# Patient Record
Sex: MALE | Race: ASIAN | Employment: FULL TIME | URBAN - NONMETROPOLITAN AREA
[De-identification: names, ages, dates, MRNs, and addresses within clinical notes are randomized per-mention and may not be internally consistent; named-entity substitution may affect disease eponyms.]

---

## 2017-01-09 ENCOUNTER — OFFICE VISIT (OUTPATIENT)
Dept: FAMILY MEDICINE | Facility: OTHER | Age: 47
End: 2017-01-09
Attending: PHYSICIAN ASSISTANT
Payer: COMMERCIAL

## 2017-01-09 VITALS
WEIGHT: 180 LBS | OXYGEN SATURATION: 98 % | SYSTOLIC BLOOD PRESSURE: 123 MMHG | BODY MASS INDEX: 28.25 KG/M2 | TEMPERATURE: 96.9 F | DIASTOLIC BLOOD PRESSURE: 80 MMHG | HEART RATE: 61 BPM | HEIGHT: 67 IN

## 2017-01-09 DIAGNOSIS — E11.9 TYPE 2 DIABETES MELLITUS WITHOUT COMPLICATION, WITHOUT LONG-TERM CURRENT USE OF INSULIN (H): Primary | ICD-10-CM

## 2017-01-09 DIAGNOSIS — Z23 NEED FOR PROPHYLACTIC VACCINATION AND INOCULATION AGAINST INFLUENZA: ICD-10-CM

## 2017-01-09 DIAGNOSIS — Z71.89 ACP (ADVANCE CARE PLANNING): Chronic | ICD-10-CM

## 2017-01-09 DIAGNOSIS — R17 ELEVATED BILIRUBIN: ICD-10-CM

## 2017-01-09 PROCEDURE — 99214 OFFICE O/P EST MOD 30 MIN: CPT | Mod: 25 | Performed by: PHYSICIAN ASSISTANT

## 2017-01-09 PROCEDURE — 90686 IIV4 VACC NO PRSV 0.5 ML IM: CPT | Performed by: PHYSICIAN ASSISTANT

## 2017-01-09 PROCEDURE — 90471 IMMUNIZATION ADMIN: CPT | Performed by: PHYSICIAN ASSISTANT

## 2017-01-09 RX ORDER — GLIMEPIRIDE 2 MG/1
2 TABLET ORAL
Qty: 90 TABLET | Refills: 1 | Status: SHIPPED | OUTPATIENT
Start: 2017-01-09 | End: 2017-07-03

## 2017-01-09 RX ORDER — LISINOPRIL 5 MG/1
5 TABLET ORAL DAILY
Qty: 90 TABLET | Refills: 1 | Status: SHIPPED | OUTPATIENT
Start: 2017-01-09 | End: 2017-07-07

## 2017-01-09 RX ORDER — FEXOFENADINE HCL 180 MG/1
180 TABLET ORAL DAILY
Qty: 30 TABLET | Refills: 1 | COMMUNITY
Start: 2017-01-09

## 2017-01-09 ASSESSMENT — ANXIETY QUESTIONNAIRES
GAD7 TOTAL SCORE: 0
5. BEING SO RESTLESS THAT IT IS HARD TO SIT STILL: NOT AT ALL
2. NOT BEING ABLE TO STOP OR CONTROL WORRYING: NOT AT ALL
1. FEELING NERVOUS, ANXIOUS, OR ON EDGE: NOT AT ALL
6. BECOMING EASILY ANNOYED OR IRRITABLE: NOT AT ALL
7. FEELING AFRAID AS IF SOMETHING AWFUL MIGHT HAPPEN: NOT AT ALL
3. WORRYING TOO MUCH ABOUT DIFFERENT THINGS: NOT AT ALL

## 2017-01-09 ASSESSMENT — PATIENT HEALTH QUESTIONNAIRE - PHQ9: 5. POOR APPETITE OR OVEREATING: NOT AT ALL

## 2017-01-09 NOTE — PROGRESS NOTES
SUBJECTIVE:                                                    Pierre Sharma is a 46 year old male who presents to clinic today for the following health issues:      Diabetes Follow-up    Patient is checking blood sugars: once daily.  Results are as follows:         am - average 150    Diabetic concerns: None     Symptoms of hypoglycemia (low blood sugar): none     Paresthesias (numbness or burning in feet) or sores: No     Date of last diabetic eye exam: December 2015        Amount of exercise or physical activity: 4-5 days/week for an average of 30-45 minutes    Problems taking medications regularly: No    Medication side effects: none    Diet: no sugar diet        Problem list and histories reviewed & adjusted, as indicated.  Additional history: as documented    Patient Active Problem List   Diagnosis     ACP (advance care planning)     Past Surgical History   Procedure Laterality Date     Cholecystectomy  2012       Social History   Substance Use Topics     Smoking status: Current Every Day Smoker -- 0.05 packs/day     Types: Cigarettes     Smokeless tobacco: Not on file      Comment: pt is cuting back on his own      Alcohol Use: Yes      Comment: occassional      Family History   Problem Relation Age of Onset     Angina Mother      Hypertension Mother      DIABETES Mother          Current Outpatient Prescriptions   Medication Sig Dispense Refill     lisinopril (PRINIVIL/ZESTRIL) 5 MG tablet Take 1 tablet (5 mg) by mouth daily 90 tablet 1     SitaGLIPtin-MetFORMIN HCl (JANUMET XR)  MG TB24 Take 1 tablet by mouth daily 90 tablet 1     glimepiride (AMARYL) 2 MG tablet Take 1 tablet (2 mg) by mouth every morning (before breakfast) 90 tablet 1     UNABLE TO FIND MEDICATION NAME: Mopaday-Forte       UNABLE TO FIND MEDICATION NAME: Cardasc 5, hypertension       UNABLE TO FIND MEDICATION NAME: Imstamet 50/500, diabetic medication       UNABLE TO FIND MEDICATION NAME: Semiglynase, diabetic medication        "triamcinolone (KENALOG) 0.1 % ointment Apply sparingly to affected area three times daily for 14 days. 30 g 0     Allergies   Allergen Reactions     Sulfa Drugs      rash     BP Readings from Last 3 Encounters:   01/09/17 123/80   10/11/16 134/86    Wt Readings from Last 3 Encounters:   01/09/17 180 lb (81.647 kg)   10/11/16 182 lb (82.555 kg)                  Problem list, Medication list, Allergies, and Medical/Social/Surgical histories reviewed in [facility] and updated as appropriate.    ROS:  Constitutional, neuro, ENT, endocrine, pulmonary, cardiac, gastrointestinal, genitourinary, musculoskeletal, integument and psychiatric systems are negative, except as otherwise noted.    OBJECTIVE:                                                    /80 mmHg  Pulse 61  Temp(Src) 96.9  F (36.1  C) (Tympanic)  Ht 5' 6.5\" (1.689 m)  Wt 180 lb (81.647 kg)  BMI 28.62 kg/m2  SpO2 98%  Body mass index is 28.62 kg/(m^2).  GENERAL APPEARANCE: healthy, alert and no distress  EYES: Eyes grossly normal to inspection, PERRL and conjunctivae and sclerae normal  HENT: ear canals and TM's normal and nose and mouth without ulcers or lesions  NECK: no adenopathy, no asymmetry, masses, or scars and thyroid normal to palpation  RESP: lungs clear to auscultation - no rales, rhonchi or wheezes  CV: regular rates and rhythm, normal S1 S2, no S3 or S4 and no murmur, click or rub  LYMPHATICS: normal ant/post cervical and supraclavicular nodes  MS: extremities normal- no gross deformities noted  SKIN: no suspicious lesions or rashes. Has scattered dry darkened rashes on his back. Due to skin color is more prominent itching is relieved  with Allegra.     NEURO: Normal strength and tone, mentation intact and speech normal  PSYCH: mentation appears normal and affect normal/bright.      Diagnostic test results:  Diagnostic Test Results:  No results found for this or any previous visit (from the past 24 hour(s)).     ASSESSMENT/PLAN:               "                                      1. Type 2 diabetes mellitus without complication, without long-term current use of insulin (H)  His primary care has been in [place].  From what I am gathering they had him on ACTOS plus Metformin plus another combination with Metformin.  So due to this we will just try Januvia Met and also Amaryl.   He is checking his sugars are in range. Just had labs done in December in [place].  He did not bring those in.  He will have one month to get me those and if on our next visit in one month he doesn't have them we will repeat them for our records.   He is good on his weight and does occasional smoke and is working on stopping this.   Will check his sugar daily.   Needing foot exam and was normal today.   He is going to see eye Dr in spring as he is up to date.  Taking his asa 81 mg.     - Comprehensive metabolic panel; Future  - CBC with platelets differential; Future  - Hemoglobin A1c; Future  - Lipid Profile; Future  - Hepatic panel (Albumin, ALT, AST, Bili, Alk Phos, TP)  - lisinopril (PRINIVIL/ZESTRIL) 5 MG tablet; Take 1 tablet (5 mg) by mouth daily  Dispense: 90 tablet; Refill: 1  - SitaGLIPtin-MetFORMIN HCl (JANUMET XR)  MG TB24; Take 1 tablet by mouth daily  Dispense: 90 tablet; Refill: 1  - glimepiride (AMARYL) 2 MG tablet; Take 1 tablet (2 mg) by mouth every morning (before breakfast)  Dispense: 90 tablet; Refill: 1    2. ACP (advance care planning)  Given.     3. Need for prophylactic vaccination and inoculation against influenza  Given.   -  FLU VAC PRESRV FREE QUAD SPLIT VIR 3+YRS IM  - Vaccine Administration, Initial [87148]    4. Elevated bilirubin  Hx of this after his gallbladder was removed.  Was quite ill.   Thought things went back to normal but not sure.  Once we get labs we will make sure all are in line.     - Hepatic panel (Albumin, ALT, AST, Bili, Alk Phos, TP)  - lisinopril (PRINIVIL/ZESTRIL) 5 MG tablet; Take 1 tablet (5 mg) by mouth daily  Dispense: 90  tablet; Refill: 1      See Patient Instructions    CHANDLER Alamo  [facility] [place]        Injectable Influenza Immunization Documentation    1.  Is the person to be vaccinated sick today?  No    2. Does the person to be vaccinated have an allergy to eggs or to a component of the vaccine?  No    3. Has the person to be vaccinated today ever had a serious reaction to influenza vaccine in the past?  No    4. Has the person to be vaccinated ever had Guillain-[place] syndrome?  No     Form completed by Mini Oneil LPN

## 2017-01-09 NOTE — MR AVS SNAPSHOT
After Visit Summary   1/9/2017    Pierre Sharma    MRN: [id number]           Patient Information     Date Of Birth          1970        Visit Information        Provider Department      1/9/2017 11:45 AM Dayna Cronin PA [facility]        Today's Diagnoses     Type 2 diabetes mellitus without complication, without long-term current use of insulin (H)    -  1     ACP (advance care planning)         Need for prophylactic vaccination and inoculation against influenza         Elevated bilirubin           Care Instructions    Thank you for choosing [facility].   I appreciate the opportunity to serve you and look forward to supporting your healthcare needs in the future. Please contact me with any questions or concerns that you may have.    Sincerely,    Dayna Cronin RN, PA-C   cetaphil body wash.          Follow-ups after your visit        Your next 10 appointments already scheduled     Apr 18, 2017  9:45 AM   (Arrive by 9:30 AM)   New Visit with EVELIA Hernandez MD   [facility] ([facility])    [address] [place] [address] [id number] [phone]              Future tests that were ordered for you today     Open Future Orders        Priority Expected Expires Ordered    Comprehensive metabolic panel Routine  1/9/2018 1/9/2017    CBC with platelets differential Routine  1/9/2018 1/9/2017    Hemoglobin A1c Routine  1/9/2018 1/9/2017    Lipid Profile Routine  1/9/2018 1/9/2017            Who to contact     If you have questions or need follow up information about today's clinic visit or your schedule please contact [facility] directly at [phone].  Normal or non-critical lab and imaging results will be communicated to you by MyChart, letter or phone within 4 business days after the clinic has received the results. If you do not hear from us within 7 days, please contact the clinic through MyChart or phone. If you have a  "critical or abnormal lab result, we will notify you by phone as soon as possible.  Submit refill requests through [employer] or call your pharmacy and they will forward the refill request to us. Please allow 3 business days for your refill to be completed.          Additional Information About Your Visit        The [facility] Information     [employer] lets you send messages to your doctor, view your test results, renew your prescriptions, schedule appointments and more. To sign up, go to [email or web address] . Click on \"Log in\" on the left side of the screen, which will take you to the Welcome page. Then click on \"Sign up Now\" on the right side of the page.     You will be asked to enter the access code listed below, as well as some personal information. Please follow the directions to create your username and password.     Your access code is: [email or web address]  Expires: 2017  3:55 PM     Your access code will  in 90 days. If you need help or a new code, please call your [facility] clinic or [phone].        Care EveryWhere ID     This is your Care EveryWhere ID. This could be used by other organizations to access your [facility] medical records  [id number]        Your Vitals Were     Pulse Temperature Height BMI (Body Mass Index) Pulse Oximetry       61 96.9  F (36.1  C) (Tympanic) 5' 6.5\" (1.689 m) 28.62 kg/m2 98%        Blood Pressure from Last 3 Encounters:   17 123/80   10/11/16 134/86    Weight from Last 3 Encounters:   17 180 lb (81.647 kg)   10/11/16 182 lb (82.555 kg)              We Performed the Following     HC FLU VAC PRESRV FREE QUAD SPLIT VIR 3+YRS IM     Hepatic panel (Albumin, ALT, AST, Bili, Alk Phos, TP)     Vaccine Administration, Initial [[id number]]          Today's Medication Changes          These changes are accurate as of: 17 12:46 PM.  If you have any questions, ask your nurse or doctor.               Start taking these medicines.        Dose/Directions    glimepiride 2 MG tablet "   Commonly known as:  AMARYL   Used for:  Type 2 diabetes mellitus without complication, without long-term current use of insulin (H)   Started by:  Dayna Cronin PA        Dose:  2 mg   Take 1 tablet (2 mg) by mouth every morning (before breakfast)   Quantity:  90 tablet   Refills:  1       lisinopril 5 MG tablet   Commonly known as:  PRINIVIL/ZESTRIL   Used for:  Elevated bilirubin, Type 2 diabetes mellitus without complication, without long-term current use of insulin (H)   Started by:  Dayna Cronin PA        Dose:  5 mg   Take 1 tablet (5 mg) by mouth daily   Quantity:  90 tablet   Refills:  1       SitaGLIPtin-MetFORMIN HCl  MG Tb24   Commonly known as:  JANUMET XR   Used for:  Type 2 diabetes mellitus without complication, without long-term current use of insulin (H)   Started by:  Dayna Cronin PA        Dose:  1 tablet   Take 1 tablet by mouth daily   Quantity:  90 tablet   Refills:  1            Where to get your medicines      [id number] - [place], [place] - [address] AT [facility] [address], [facility] [address]     Phone:  [phone]    - glimepiride 2 MG tablet  - lisinopril 5 MG tablet  - SitaGLIPtin-MetFORMIN HCl  MG Tb24             Primary Care Provider    None Specified       No primary provider on file.        Thank you!     Thank you for choosing [facility]  for your care. Our goal is always to provide you with excellent care. Hearing back from our patients is one way we can continue to improve our services. Please take a few minutes to complete the written survey that you may receive in the mail after your visit with us. Thank you!             Your Updated Medication List - Protect others around you: Learn how to safely use, store and throw away your medicines at www.disposemymeds.org.          This list is accurate as of: 1/9/17 12:46 PM.  Always use your most recent med list.                    Brand Name Dispense Instructions for use    fexofenadine 180 MG tablet    ALLEGRA    30 tablet    Take 1 tablet (180 mg) by mouth daily       glimepiride 2 MG tablet    AMARYL    90 tablet    Take 1 tablet (2 mg) by mouth every morning (before breakfast)       lisinopril 5 MG tablet    PRINIVIL/ZESTRIL    90 tablet    Take 1 tablet (5 mg) by mouth daily       SitaGLIPtin-MetFORMIN HCl  MG Tb24    JANUMET XR    90 tablet    Take 1 tablet by mouth daily       triamcinolone 0.1 % ointment    KENALOG    30 g    Apply sparingly to affected area three times daily for 14 days.       * UNABLE TO FIND      MEDICATION NAME: Mopaday-Forte       * UNABLE TO FIND      MEDICATION NAME: Imstamet 50/500, diabetic medication       * UNABLE TO FIND      MEDICATION NAME: Semiglynase, diabetic medication       * UNABLE TO FIND      MEDICATION NAME: Cardasc 5, hypertension       * Notice:  This list has 4 medication(s) that are the same as other medications prescribed for you. Read the directions carefully, and ask your doctor or other care provider to review them with you.

## 2017-01-09 NOTE — NURSING NOTE
"Chief Complaint   Patient presents with     Diabetes     pt would like lab work done- he did eat breakfast this am      *_* Health Care Directive *_*     packet given to patient       Initial /80 mmHg  Pulse 61  Temp(Src) 96.9  F (36.1  C) (Tympanic)  Ht 5' 6.5\" (1.689 m)  Wt 180 lb (81.647 kg)  BMI 28.62 kg/m2  SpO2 98% Estimated body mass index is 28.62 kg/(m^2) as calculated from the following:    Height as of this encounter: 5' 6.5\" (1.689 m).    Weight as of this encounter: 180 lb (81.647 kg).  BP completed using cuff size: melissa Oneil LPN      "

## 2017-01-09 NOTE — PATIENT INSTRUCTIONS
Thank you for choosing [facility].   I appreciate the opportunity to serve you and look forward to supporting your healthcare needs in the future. Please contact me with any questions or concerns that you may have.    Sincerely,    Dayna Cronin RN, PA-C   cetaphil body wash.

## 2017-01-10 ASSESSMENT — ANXIETY QUESTIONNAIRES: GAD7 TOTAL SCORE: 0

## 2017-01-10 ASSESSMENT — PATIENT HEALTH QUESTIONNAIRE - PHQ9: SUM OF ALL RESPONSES TO PHQ QUESTIONS 1-9: 0

## 2017-01-12 ENCOUNTER — TELEPHONE (OUTPATIENT)
Dept: FAMILY MEDICINE | Facility: OTHER | Age: 47
End: 2017-01-12

## 2017-01-12 NOTE — TELEPHONE ENCOUNTER
Prior authorization done for medication Janumet.  This request was completed on Cover my meds. They will fax outcome.  Gayatri Riddle LPN

## 2017-01-18 ENCOUNTER — TELEPHONE (OUTPATIENT)
Dept: FAMILY MEDICINE | Facility: OTHER | Age: 47
End: 2017-01-18

## 2017-01-18 DIAGNOSIS — E11.8 TYPE 2 DIABETES MELLITUS WITH COMPLICATION, WITHOUT LONG-TERM CURRENT USE OF INSULIN (H): Primary | ICD-10-CM

## 2017-01-18 NOTE — TELEPHONE ENCOUNTER
Received faxes and phone calls from patient today about Janumet not being covered under insurance. You started on this as it was the equivilent to Istamet he was taking from [place]. I spoke to patient and the 3 diabetic meds he is taking is all he has ever tried, per insurance he has to try and fail Tradjenta and Jentadueto to get this covered. The (Jentadueto is a combination of Metformin and Linagliptin.) Please advise on what you want to change this to.    Also patient is wondering what [employer] you are going to change his Mopaday-Forte to? This is combination of Pioglitazone and Metformin. Brand name of Actoplus Met, Piomet or Politor.  Please advise.  Gayatri Riddle LPN

## 2017-01-18 NOTE — TELEPHONE ENCOUNTER
11:07 AM    Reason for Call: Phone Call    Description: Pt called to check on status of prior auth on [employer]/ins company stated they are still waiting on a verification from provider/please call pt back at [phone]/pt stated he has been out of med for 8 days    Was an appointment offered for this call? No    Preferred method for responding to this message: Telephone Call    If we cannot reach you directly, may we leave a detailed response at the number you provided? Yes    Can this message wait until your PCP/provider returns, if available today? Not applicable    Coco Du

## 2017-01-19 PROBLEM — E11.8 TYPE 2 DIABETES MELLITUS WITH COMPLICATION, WITHOUT LONG-TERM CURRENT USE OF INSULIN (H): Status: ACTIVE | Noted: 2017-01-19

## 2017-01-19 NOTE — TELEPHONE ENCOUNTER
Pt called back and stated he had not heard back from provider yet regarding med/please call him back at [phone]

## 2017-01-19 NOTE — TELEPHONE ENCOUNTER
We will try the Trujenta and Metformin.  Not covering Januvia.   He was on Avandia and Actos not been using those much.  They were all combined with Metformin.  For that reason we will give the metformen separate.

## 2017-01-19 NOTE — TELEPHONE ENCOUNTER
Left message that new medications were being sent in. Reminded he needs a follow up in 1 month and also needs to get us past medical records.  Gayatri Riddle LPN

## 2017-02-17 ENCOUNTER — ALLIED HEALTH/NURSE VISIT (OUTPATIENT)
Dept: FAMILY MEDICINE | Facility: OTHER | Age: 47
End: 2017-02-17
Attending: PHYSICIAN ASSISTANT
Payer: COMMERCIAL

## 2017-02-17 DIAGNOSIS — Z23 NEED FOR PROPHYLACTIC VACCINATION AND INOCULATION AGAINST INFLUENZA: Primary | ICD-10-CM

## 2017-02-17 PROCEDURE — 90686 IIV4 VACC NO PRSV 0.5 ML IM: CPT

## 2017-02-17 PROCEDURE — 90471 IMMUNIZATION ADMIN: CPT

## 2017-02-17 NOTE — PROGRESS NOTES
Patient presents for nurse only flu shot. Patient states he forgot to get this at his last visit. I reviewed chart as I remembered giving him injection. Per chart he had flu shot at last visit with Dayna Cronin PA-C. He insisting that he didn't receive this. Stated he was suppose to and was going to but got interrupted and never received it. I spoke with Dayna Cronin who also said she thought he had received it at his last visit. Patient still insistent he did not receive injection. Per Dayna Cronin okay to give flu shot. If he did receive like we think an additional injection will not harm him.  Gayatri Riddle LPN    Injectable Influenza Immunization Documentation    1.  Is the person to be vaccinated sick today?  No    2. Does the person to be vaccinated have an allergy to eggs or to a component of the vaccine?  No    3. Has the person to be vaccinated today ever had a serious reaction to influenza vaccine in the past?  No    4. Has the person to be vaccinated ever had Guillain-[place] syndrome?  No     Form completed by Gayatri Riddle LPN

## 2017-02-17 NOTE — MR AVS SNAPSHOT
"              After Visit Summary   2/17/2017    Pierre Sharma    MRN: [id number]           Patient Information     Date Of Birth          1970        Visit Information        Provider Department      2/17/2017 11:30 AM MICHELLE ANTHONY NURSE [facility]        Today's Diagnoses     Need for prophylactic vaccination and inoculation against influenza    -  1       Follow-ups after your visit        Your next 10 appointments already scheduled     Apr 18, 2017  9:45 AM CDT   (Arrive by 9:30 AM)   New Visit with EVELIA Hernandez MD   [facility] [place] ([facility] [place] [facility])    [address]  [place] [id number] [phone]              Who to contact     If you have questions or need follow up information about today's clinic visit or your schedule please contact [facility] directly at [phone].  Normal or non-critical lab and imaging results will be communicated to you by MyChart, letter or phone within 4 business days after the clinic has received the results. If you do not hear from us within 7 days, please contact the clinic through MyChart or phone. If you have a critical or abnormal lab result, we will notify you by phone as soon as possible.  Submit refill requests through [email or web address] or call your pharmacy and they will forward the refill request to us. Please allow 3 business days for your refill to be completed.          Additional Information About Your Visit        MyChart Information     [email or web address] lets you send messages to your doctor, view your test results, renew your prescriptions, schedule appointments and more. To sign up, go to [email or web address] . Click on \"Log in\" on the left side of the screen, which will take you to the Welcome page. Then click on \"Sign up Now\" on the right side of the page.     You will be asked to enter the access code listed below, as well as some personal information. Please follow the directions to create your username and " password.     Your access code is: [email or web address]  Expires: 2017  4:54 PM     Your access code will  in 90 days. If you need help or a new code, please call your [facility] clinic or [phone].        Care EveryWhere ID     This is your Care EveryWhere ID. This could be used by other organizations to access your [facility] medical records  [id number]         Blood Pressure from Last 3 Encounters:   17 123/80   10/11/16 134/86    Weight from Last 3 Encounters:   17 180 lb (81.6 kg)   10/11/16 182 lb (82.6 kg)              We Performed the Following     FLU VAC, SPLIT VIRUS IM > 3 YO (QUADRIVALENT) [00347]     Vaccine Administration, Initial [[id number]]        Primary Care Provider Office Phone # Fax #    CHANDLER Jennings [phone]       [facility] [address] [facility] [address]  [place] [address]        Thank you!     Thank you for choosing [facility]  for your care. Our goal is always to provide you with excellent care. Hearing back from our patients is one way we can continue to improve our services. Please take a few minutes to complete the written survey that you may receive in the mail after your visit with us. Thank you!             Your Updated Medication List - Protect others around you: Learn how to safely use, store and throw away your medicines at www.disposemymeds.org.          This list is accurate as of: 17  4:54 PM.  Always use your most recent med list.                   Brand Name Dispense Instructions for use    fexofenadine 180 MG tablet    ALLEGRA    30 tablet    Take 1 tablet (180 mg) by mouth daily       glimepiride 2 MG tablet    AMARYL    90 tablet    Take 1 tablet (2 mg) by mouth every morning (before breakfast)       linagliptin 5 MG Tabs tablet    TRADJENTA    30 tablet    Take 1 tablet (5 mg) by mouth daily       lisinopril 5 MG tablet    PRINIVIL/ZESTRIL    90 tablet    Take 1 tablet (5 mg) by mouth daily       metFORMIN 1000  MG tablet    GLUCOPHAGE    60 tablet    Take 1 tablet (1,000 mg) by mouth 2 times daily (with meals)       SitaGLIPtin-MetFORMIN HCl  MG Tb24    JANUMET XR    90 tablet    Take 1 tablet by mouth daily       triamcinolone 0.1 % ointment    KENALOG    30 g    Apply sparingly to affected area three times daily for 14 days.       * UNABLE TO FIND      MEDICATION NAME: Mopaday-Forte       * UNABLE TO FIND      MEDICATION NAME: Imstamet 50/500, diabetic medication       * UNABLE TO FIND      MEDICATION NAME: Semiglynase, diabetic medication       * UNABLE TO FIND      MEDICATION NAME: Cardasc 5, hypertension       * Notice:  This list has 4 medication(s) that are the same as other medications prescribed for you. Read the directions carefully, and ask your doctor or other care provider to review them with you.

## 2017-02-19 DIAGNOSIS — E11.8 TYPE 2 DIABETES MELLITUS WITH COMPLICATION, WITHOUT LONG-TERM CURRENT USE OF INSULIN (H): ICD-10-CM

## 2017-02-20 RX ORDER — LINAGLIPTIN 5 MG/1
TABLET, FILM COATED ORAL
Qty: 30 TABLET | Refills: 3 | Status: SHIPPED | OUTPATIENT
Start: 2017-02-20 | End: 2017-03-10

## 2017-02-21 DIAGNOSIS — E11.8 TYPE 2 DIABETES MELLITUS WITH COMPLICATION, WITHOUT LONG-TERM CURRENT USE OF INSULIN (H): ICD-10-CM

## 2017-03-10 ENCOUNTER — OFFICE VISIT (OUTPATIENT)
Dept: FAMILY MEDICINE | Facility: OTHER | Age: 47
End: 2017-03-10
Attending: PHYSICIAN ASSISTANT
Payer: COMMERCIAL

## 2017-03-10 VITALS
BODY MASS INDEX: 28.14 KG/M2 | TEMPERATURE: 97.3 F | WEIGHT: 177 LBS | DIASTOLIC BLOOD PRESSURE: 76 MMHG | HEART RATE: 63 BPM | SYSTOLIC BLOOD PRESSURE: 122 MMHG | OXYGEN SATURATION: 98 %

## 2017-03-10 DIAGNOSIS — E11.9 ENCOUNTER FOR DIABETIC FOOT EXAM (H): ICD-10-CM

## 2017-03-10 DIAGNOSIS — E11.8 TYPE 2 DIABETES MELLITUS WITH COMPLICATION, WITHOUT LONG-TERM CURRENT USE OF INSULIN (H): ICD-10-CM

## 2017-03-10 DIAGNOSIS — F17.200 TOBACCO USE DISORDER: Primary | ICD-10-CM

## 2017-03-10 PROCEDURE — 99214 OFFICE O/P EST MOD 30 MIN: CPT | Performed by: PHYSICIAN ASSISTANT

## 2017-03-10 ASSESSMENT — PAIN SCALES - GENERAL: PAINLEVEL: NO PAIN (0)

## 2017-03-10 NOTE — NURSING NOTE
"Chief Complaint   Patient presents with     Diabetes     follow up        Initial /76 (BP Location: Left arm, Patient Position: Chair, Cuff Size: Adult Large)  Pulse 63  Temp 97.3  F (36.3  C) (Tympanic)  Wt 177 lb (80.3 kg)  SpO2 98%  BMI 28.14 kg/m2 Estimated body mass index is 28.14 kg/(m^2) as calculated from the following:    Height as of 1/9/17: 5' 6.5\" (1.689 m).    Weight as of this encounter: 177 lb (80.3 kg).  Medication Reconciliation: complete  Salma Collier CMA(AAMA)   "

## 2017-03-10 NOTE — PATIENT INSTRUCTIONS
Diabetes: Importance of Exercise  Why is exercise important?  Exercise helps keep your child's blood sugar under control. You should make a special effort to plan daily exercise for your child. Young people with diabetes can participate in almost every sport. Many professional athletes have diabetes. Exercise helps people with diabetes (both type 1 and type 2) in many ways:  Exercise helps the body burn more sugar. Insulin is more effective during exercise. More sugar and insulin flows in the blood to the muscles during exercise. This causes more sugar to be burned. Exercise usually helps lower the blood sugar.   Exercise makes you feel better. Children who exercise tend not to tire as easily and feel happier and healthier.   Exercise helps keep the body in good shape. Lack of activity leads to health problems such as obesity and heart trouble. Exercise helps burn extra calories and helps your child keep a normal weight.   Exercise helps keep the heart rate and blood pressure lower. People who exercise have healthy hearts and the heart doesn't have to pump as hard. Low blood pressure helps prevent heart problems as well as other complications of diabetes such as eye and kidney problems.   Exercise helps keep blood fat levels normal. Many children with diabetes have high levels of the blood fats (cholesterol and triglycerides). High blood fat levels can lead to early aging of blood vessels. Exercise and good blood sugar control are the best ways to reduce blood fat levels.   Exercise helps the body become more sensitive to insulin. Research has shown that after 1 hour of afternoon exercise, blood sugars will stay lower until the next morning. Exercise makes the body more sensitive to insulin, the insulin can work more efficiently, and usually a lower daily dose is needed.   Exercise helps normal blood circulation to the feet. Exercise can help blood circulation to the feet and prevent foot  problems.  Exercise is very important for children who have type 2 diabetes or are at risk for type 2 diabetes. If your child is overweight, losing weight can reduce the risk for developing type 2 diabetes by more than half. This is best done by eating less and exercising more.  Which kinds of exercise are best?  The best exercise is one your child likes. It is easier to make exercise a habit if your child enjoys the activity. Your child needs to choose an aerobic exercise. Only aerobic exercises help the heart. Aerobic exercises include jogging, walking, swimming, or bicycling. They should be done for 30 minutes or longer. Activities such as weight lifting that are done in short bursts with rests in between are strength-building exercises, not aerobic exercise.  Boxing is not a good exercise for children with diabetes. Eye injuries are common in boxing and eye problems are a possible complication of diabetes. Also, the high risk for brain damage makes boxing dangerous for people with or without diabetes.  Preventing low blood sugar levels is important in all sports. It is very important for sports such as scuba diving that could be dangerous. Fortunately, dangerous activities are not generally used for daily aerobic activity.  How do I help my child get started?  When starting a new exercise program, it is always best to start slowly. Gradually extend the time and amount of exercise. This will result in fewer sore muscles and a better chance to continue the program. The best way to make exercise a part of everyday living is to start early in life. Older children may not be as willing to start a regular exercise program. Make exercise part of the normal routine. You need to be a good example by exercising regularly. Many children prefer TV or computer games to exercise. You may have to encourage a change in attitude. Make exercise activities such as skating and swimming a reward for your child. It helps if you can  have fun with your child in the activity. Jogging, walking, or jumping rope is good for parents too! A child of any age will soon  your good attitude toward exercise.   When should my child exercise?  The best time to exercise will vary with your schedule. Children like to play after school, and most organized sports activities take place then. This is the time that intermediate-acting insulin (such as NPH) has its main effect. Exercise often causes the body's blood sugar level to drop. Extra care to prevent low blood sugar is important. When possible, pick an exercise time, preferably the same time each day, and adjust the snacks and insulin dose to fit the exercise. Remember, you can adjust your child's diabetes management to suit your child's lifestyle. Your child's lifestyle does not have to be adjusted to fit diabetes.  When should my child not exercise?  If your child's urine ketone level is large or moderate, exercise can raise the sugar or ketone level even higher. So, do not let your child exercise when urine ketones are moderate or large. Remember to check urine ketones before exercising if your child is not feeling well.  How often should my child exercise?  To improve the health of the heart, your child should have at least 30 minutes of aerobic exercise 5 or more times per week. The more exercise a person gets, the more fat is burned. Some people burn more calories with their exercise than others. This is partly related to how hard the person exercises. If weight loss is one of the goals for your child, it may be necessary to exercise harder or for a longer period to reach the desired goals.  How can I help my child prevent low blood sugar (hypoglycemic) reactions during exercise?   You can plan the exercise after a meal, reduce the insulin dosage, or take extra snacks to help prevent low blood sugar during exercise. This will take some experimenting with dosages and record keeping. Your child  "should always carry a source of sugar at all times and have a longer lasting snack nearby. Remember, it is wise to THINK AHEAD about the day's schedule and plan accordingly.    Published by [employer].  This content is reviewed periodically and is subject to change as new health information becomes available. The information is intended to inform and educate and is not a replacement for medical evaluation, advice, diagnosis or treatment by a healthcare professional.  Abstracted from the book, \"Understanding Diabetes,\" 11th Edition, by MORIAH Coello MD (available by calling 1-901.706.1995).    2011 [employer] and/or its affiliates. All rights reserved.               "

## 2017-03-10 NOTE — MR AVS SNAPSHOT
After Visit Summary   3/10/2017    Pierre Sharma    MRN: [id number]           Patient Information     Date Of Birth          1970        Visit Information        Provider Department      3/10/2017 11:45 AM Dayna Cronin PA [facility]        Today's Diagnoses     Type 2 diabetes mellitus with complication, without long-term current use of insulin (H)          Care Instructions                     Diabetes: Importance of Exercise  Why is exercise important?  Exercise helps keep your child's blood sugar under control. You should make a special effort to plan daily exercise for your child. Young people with diabetes can participate in almost every sport. Many professional athletes have diabetes. Exercise helps people with diabetes (both type 1 and type 2) in many ways:  Exercise helps the body burn more sugar. Insulin is more effective during exercise. More sugar and insulin flows in the blood to the muscles during exercise. This causes more sugar to be burned. Exercise usually helps lower the blood sugar.   Exercise makes you feel better. Children who exercise tend not to tire as easily and feel happier and healthier.   Exercise helps keep the body in good shape. Lack of activity leads to health problems such as obesity and heart trouble. Exercise helps burn extra calories and helps your child keep a normal weight.   Exercise helps keep the heart rate and blood pressure lower. People who exercise have healthy hearts and the heart doesn't have to pump as hard. Low blood pressure helps prevent heart problems as well as other complications of diabetes such as eye and kidney problems.   Exercise helps keep blood fat levels normal. Many children with diabetes have high levels of the blood fats (cholesterol and triglycerides). High blood fat levels can lead to early aging of blood vessels. Exercise and good blood sugar control are the best ways to reduce blood fat levels.   Exercise  helps the body become more sensitive to insulin. Research has shown that after 1 hour of afternoon exercise, blood sugars will stay lower until the next morning. Exercise makes the body more sensitive to insulin, the insulin can work more efficiently, and usually a lower daily dose is needed.   Exercise helps normal blood circulation to the feet. Exercise can help blood circulation to the feet and prevent foot problems.  Exercise is very important for children who have type 2 diabetes or are at risk for type 2 diabetes. If your child is overweight, losing weight can reduce the risk for developing type 2 diabetes by more than half. This is best done by eating less and exercising more.  Which kinds of exercise are best?  The best exercise is one your child likes. It is easier to make exercise a habit if your child enjoys the activity. Your child needs to choose an aerobic exercise. Only aerobic exercises help the heart. Aerobic exercises include jogging, walking, swimming, or bicycling. They should be done for 30 minutes or longer. Activities such as weight lifting that are done in short bursts with rests in between are strength-building exercises, not aerobic exercise.  Boxing is not a good exercise for children with diabetes. Eye injuries are common in boxing and eye problems are a possible complication of diabetes. Also, the high risk for brain damage makes boxing dangerous for people with or without diabetes.  Preventing low blood sugar levels is important in all sports. It is very important for sports such as scuba diving that could be dangerous. Fortunately, dangerous activities are not generally used for daily aerobic activity.  How do I help my child get started?  When starting a new exercise program, it is always best to start slowly. Gradually extend the time and amount of exercise. This will result in fewer sore muscles and a better chance to continue the program. The best way to make exercise a part of  everyday living is to start early in life. Older children may not be as willing to start a regular exercise program. Make exercise part of the normal routine. You need to be a good example by exercising regularly. Many children prefer TV or computer games to exercise. You may have to encourage a change in attitude. Make exercise activities such as skating and swimming a reward for your child. It helps if you can have fun with your child in the activity. Jogging, walking, or jumping rope is good for parents too! A child of any age will soon  your good attitude toward exercise.   When should my child exercise?  The best time to exercise will vary with your schedule. Children like to play after school, and most organized sports activities take place then. This is the time that intermediate-acting insulin (such as NPH) has its main effect. Exercise often causes the body's blood sugar level to drop. Extra care to prevent low blood sugar is important. When possible, pick an exercise time, preferably the same time each day, and adjust the snacks and insulin dose to fit the exercise. Remember, you can adjust your child's diabetes management to suit your child's lifestyle. Your child's lifestyle does not have to be adjusted to fit diabetes.  When should my child not exercise?  If your child's urine ketone level is large or moderate, exercise can raise the sugar or ketone level even higher. So, do not let your child exercise when urine ketones are moderate or large. Remember to check urine ketones before exercising if your child is not feeling well.  How often should my child exercise?  To improve the health of the heart, your child should have at least 30 minutes of aerobic exercise 5 or more times per week. The more exercise a person gets, the more fat is burned. Some people burn more calories with their exercise than others. This is partly related to how hard the person exercises. If weight loss is one of the goals  "for your child, it may be necessary to exercise harder or for a longer period to reach the desired goals.  How can I help my child prevent low blood sugar (hypoglycemic) reactions during exercise?   You can plan the exercise after a meal, reduce the insulin dosage, or take extra snacks to help prevent low blood sugar during exercise. This will take some experimenting with dosages and record keeping. Your child should always carry a source of sugar at all times and have a longer lasting snack nearby. Remember, it is wise to THINK AHEAD about the day's schedule and plan accordingly.    Published by [employer].  This content is reviewed periodically and is subject to change as new health information becomes available. The information is intended to inform and educate and is not a replacement for medical evaluation, advice, diagnosis or treatment by a healthcare professional.  Abstracted from the book, \"Understanding Diabetes,\" 11th Edition, by MORIAH Coello MD (available by calling 1-358.218.6290).    2011 [facility] and/or its affiliates. All rights reserved.                     Follow-ups after your visit        Your next 10 appointments already scheduled     Apr 18, 2017  9:45 AM CDT   (Arrive by 9:30 AM)   New Visit with EVELIA Hernandez MD   [facility] [place] ([facility])    [address]  [place] [address] [phone]              Future tests that were ordered for you today     Open Future Orders        Priority Expected Expires Ordered    Albumin Random Urine Quantitative Routine  3/10/2018 3/10/2017    Hemoglobin A1c Routine  3/10/2018 3/10/2017    CBC with platelets differential Routine  3/10/2018 3/10/2017    Comprehensive metabolic panel Routine  3/10/2018 3/10/2017    Lipid Profile Routine  3/10/2018 3/10/2017            Who to contact     If you have questions or need follow up information about today's clinic visit or your schedule please contact [facility] " "directly at [phone].  Normal or non-critical lab and imaging results will be communicated to you by MyChart, letter or phone within 4 business days after the clinic has received the results. If you do not hear from us within 7 days, please contact the clinic through [facility] or phone. If you have a critical or abnormal lab result, we will notify you by phone as soon as possible.  Submit refill requests through [employer] or call your pharmacy and they will forward the refill request to us. Please allow 3 business days for your refill to be completed.          Additional Information About Your Visit        [facility] Information     [employer] lets you send messages to your doctor, view your test results, renew your prescriptions, schedule appointments and more. To sign up, go to [email or web address] . Click on \"Log in\" on the left side of the screen, which will take you to the Welcome page. Then click on \"Sign up Now\" on the right side of the page.     You will be asked to enter the access code listed below, as well as some personal information. Please follow the directions to create your username and password.     Your access code is: [email or web address]  Expires: 2017  4:54 PM     Your access code will  in 90 days. If you need help or a new code, please call your [facility] clinic or [phone].        Care EveryWhere ID     This is your Care EveryWhere ID. This could be used by other organizations to access your [facility] medical records  [id number]        Your Vitals Were     Pulse Temperature Pulse Oximetry BMI (Body Mass Index)          63 97.3  F (36.3  C) (Tympanic) 98% 28.14 kg/m2         Blood Pressure from Last 3 Encounters:   03/10/17 122/76   17 123/80   10/11/16 134/86    Weight from Last 3 Encounters:   03/10/17 177 lb (80.3 kg)   17 180 lb (81.6 kg)   10/11/16 182 lb (82.6 kg)                 Today's Medication Changes          These changes are accurate as of: 3/10/17 12:56 PM.  If " you have any questions, ask your nurse or doctor.               These medicines have changed or have updated prescriptions.        Dose/Directions    linagliptin 5 MG Tabs tablet   Commonly known as:  TRADJENTA   This may have changed:  See the new instructions.   Used for:  Type 2 diabetes mellitus with complication, without long-term current use of insulin (H)   Changed by:  Dayna Cronin PA        Dose:  5 mg   Take 1 tablet (5 mg) by mouth daily   Quantity:  90 tablet   Refills:  3            Where to get your medicines      These medications were sent to [id number] - [place] - [address] AT [facility] [address], [place] [address]     Phone:  [phone]     linagliptin 5 MG Tabs tablet                Primary Care Provider Office Phone # Fax #    CHANDLER Jennings [phone]       [facility] [address]  [place] [address]        Thank you!     Thank you for choosing [facility]  for your care. Our goal is always to provide you with excellent care. Hearing back from our patients is one way we can continue to improve our services. Please take a few minutes to complete the written survey that you may receive in the mail after your visit with us. Thank you!             Your Updated Medication List - Protect others around you: Learn how to safely use, store and throw away your medicines at www.disposemymeds.org.          This list is accurate as of: 3/10/17 12:56 PM.  Always use your most recent med list.                   Brand Name Dispense Instructions for use    fexofenadine 180 MG tablet    ALLEGRA    30 tablet    Take 1 tablet (180 mg) by mouth daily       glimepiride 2 MG tablet    AMARYL    90 tablet    Take 1 tablet (2 mg) by mouth every morning (before breakfast)       linagliptin 5 MG Tabs tablet    TRADJENTA    90 tablet    Take 1 tablet (5 mg) by mouth daily       lisinopril 5 MG tablet     PRINIVIL/ZESTRIL    90 tablet    Take 1 tablet (5 mg) by mouth daily       metFORMIN 1000 MG tablet    GLUCOPHAGE    60 tablet    TAKE 1 TABLET BY MOUTH TWICE DAILY WITH MEALS       SitaGLIPtin-MetFORMIN HCl  MG Tb24    JANUMET XR    90 tablet    Take 1 tablet by mouth daily       triamcinolone 0.1 % ointment    KENALOG    30 g    Apply sparingly to affected area three times daily for 14 days.       * UNABLE TO FIND      MEDICATION NAME: Mopaday-Forte       * UNABLE TO FIND      MEDICATION NAME: Imstamet 50/500, diabetic medication       * UNABLE TO FIND      MEDICATION NAME: Semiglynase, diabetic medication       * UNABLE TO FIND      MEDICATION NAME: Cardasc 5, hypertension       * Notice:  This list has 4 medication(s) that are the same as other medications prescribed for you. Read the directions carefully, and ask your doctor or other care provider to review them with you.

## 2017-03-10 NOTE — PROGRESS NOTES
SUBJECTIVE:                                                    Pierre Sharma is a 46 year old male who presents to clinic today for the following health issues:      Diabetes Follow-up      Patient is checking blood sugars: every 2 to 3 days     Diabetic concerns: None     Symptoms of hypoglycemia (low blood sugar): none     Paresthesias (numbness or burning in feet) or sores: No     Date of last diabetic eye exam: DUE        Amount of exercise or physical activity: None    Problems taking medications regularly: No    Medication side effects: none  Diet: regular (no restrictions) no sweets     Hyperlipidemia Follow-Up      Rate your low fat/cholesterol diet?: good    Taking statin?  No    Other lipid medications/supplements?:  none       Problem list and histories reviewed & adjusted, as indicated.  Additional history: as documented    Patient Active Problem List   Diagnosis     ACP (advance care planning)     Type 2 diabetes mellitus with complication, without long-term current use of insulin (H)     Past Surgical History   Procedure Laterality Date     Cholecystectomy  2012       Social History   Substance Use Topics     Smoking status: Current Every Day Smoker     Packs/day: 0.05     Types: Cigarettes     Smokeless tobacco: Not on file      Comment: pt is cuting back on his own      Alcohol use Yes      Comment: occassional      Family History   Problem Relation Age of Onset     Angina Mother      Hypertension Mother      DIABETES Mother          Current Outpatient Prescriptions   Medication Sig Dispense Refill     metFORMIN (GLUCOPHAGE) 1000 MG tablet TAKE 1 TABLET BY MOUTH TWICE DAILY WITH MEALS 60 tablet 2     TRADJENTA 5 MG TABS tablet TAKE 1 TABLET BY MOUTH EVERY DAY 30 tablet 3     lisinopril (PRINIVIL/ZESTRIL) 5 MG tablet Take 1 tablet (5 mg) by mouth daily 90 tablet 1     SitaGLIPtin-MetFORMIN HCl (JANUMET XR)  MG TB24 Take 1 tablet by mouth daily 90 tablet 1     glimepiride (AMARYL) 2 MG  tablet Take 1 tablet (2 mg) by mouth every morning (before breakfast) 90 tablet 1     fexofenadine (ALLEGRA) 180 MG tablet Take 1 tablet (180 mg) by mouth daily 30 tablet 1     UNABLE TO FIND MEDICATION NAME: Mopaday-Forte       UNABLE TO FIND MEDICATION NAME: Imstamet 50/500, diabetic medication       UNABLE TO FIND MEDICATION NAME: Semiglynase, diabetic medication       UNABLE TO FIND MEDICATION NAME: Cardasc 5, hypertension       triamcinolone (KENALOG) 0.1 % ointment Apply sparingly to affected area three times daily for 14 days. 30 g 0     Allergies   Allergen Reactions     Sulfa Drugs      rash     BP Readings from Last 3 Encounters:   03/10/17 122/76   01/09/17 123/80   10/11/16 134/86    Wt Readings from Last 3 Encounters:   03/10/17 177 lb (80.3 kg)   01/09/17 180 lb (81.6 kg)   10/11/16 182 lb (82.6 kg)                    Reviewed and updated as needed this visit by clinical staff  Tobacco  Allergies  Meds  Med Hx  Surg Hx  Fam Hx  Soc Hx      Reviewed and updated as needed this visit by Provider         ROS:  Constitutional, neuro, ENT, endocrine, pulmonary, cardiac, gastrointestinal, genitourinary, musculoskeletal, integument and psychiatric systems are negative, except as otherwise noted.    OBJECTIVE:                                                    /76 (BP Location: Left arm, Patient Position: Chair, Cuff Size: Adult Large)  Pulse 63  Temp 97.3  F (36.3  C) (Tympanic)  Wt 177 lb (80.3 kg)  SpO2 98%  BMI 28.14 kg/m2  Body mass index is 28.14 kg/(m^2).  GENERAL APPEARANCE: healthy, alert and no distress  EYES: Eyes grossly normal to inspection, PERRL and conjunctivae and sclerae normal  HENT: ear canals and TM's normal and nose and mouth without ulcers or lesions  NECK: no adenopathy, no asymmetry, masses, or scars and thyroid normal to palpation  RESP: lungs clear to auscultation - no rales, rhonchi or wheezes  CV: regular rates and rhythm, normal S1 S2, no S3 or S4 and no murmur, click  or rub  LYMPHATICS: normal ant/post cervical and supraclavicular nodes  ABDOMEN: soft, nontender, without hepatosplenomegaly or masses and bowel sounds normal  MS: extremities normal- no gross deformities noted  SKIN: no suspicious lesions or rashes  NEURO: Normal strength and tone, mentation intact and speech normal  PSYCH: mentation appears normal and affect normal/bright    Diagnostic test results:  Diagnostic Test Results:  No results found for this or any previous visit.     Lab Results   Component Value Date    WBC 6.0 03/13/2017     Lab Results   Component Value Date    RBC 4.66 03/13/2017     Lab Results   Component Value Date    HGB 14.5 03/13/2017     Lab Results   Component Value Date    HCT 41.7 03/13/2017     No components found for: MCT  Lab Results   Component Value Date    MCV 90 03/13/2017     Lab Results   Component Value Date    MCH 31.1 03/13/2017     Lab Results   Component Value Date    MCHC 34.8 03/13/2017     Lab Results   Component Value Date    RDW 12.8 03/13/2017     Lab Results   Component Value Date     03/13/2017     Lab Results   Component Value Date    A1C 6.6 03/13/2017     Lab Results   Component Value Date    ALBUMIN 3.8 03/13/2017     Lab Results   Component Value Date    AST 16 03/13/2017     Lab Results   Component Value Date    ALT 31 03/13/2017     No results found for: BILICONJ   Lab Results   Component Value Date    BILITOTAL 0.5 03/13/2017     Lab Results   Component Value Date    ALBUMIN 3.8 03/13/2017     Lab Results   Component Value Date    PROTTOTAL 7.3 03/13/2017      Lab Results   Component Value Date    ALKPHOS 101 03/13/2017     No components found for: URINE    ASSESSMENT/PLAN:                                                    1. Type 2 diabetes mellitus with complication, without long-term current use of insulin (H)  He was to bring labs in from  [facility].  We made a large change in his medication for diabetes.   We are not finding his labs are not up to  date.   We will do those today.  Reviewed his medication required if diabetic.  Once we get this all in line we will make further scipts.  He is getting his medication here. Getting his eye exam as it is due in the spring.   We did discuss cholesterol medications and his mom does have a hx of heard failure and disease but his father is quite healthy.   He does smoke and we did address this today. We did check his liver as he had some kind of hepatitis years ago. He ws told it should cause much in the way of problems.  See us back in 6 months.      - Albumin Random Urine Quantitative; Future  - Hemoglobin A1c; Future  - CBC with platelets differential; Future  - Comprehensive metabolic panel; Future  - Lipid Profile; Future  - linagliptin (TRADJENTA) 5 MG TABS tablet; Take 1 tablet (5 mg) by mouth daily  Dispense: 90 tablet; Refill: 3      Follow up with Provider - 6 month     CHANDLER Alamo  [facility]

## 2017-03-13 DIAGNOSIS — E11.8 TYPE 2 DIABETES MELLITUS WITH COMPLICATION, WITHOUT LONG-TERM CURRENT USE OF INSULIN (H): ICD-10-CM

## 2017-03-13 DIAGNOSIS — R17 ELEVATED BILIRUBIN: ICD-10-CM

## 2017-03-13 DIAGNOSIS — E11.9 TYPE 2 DIABETES MELLITUS WITHOUT COMPLICATION, WITHOUT LONG-TERM CURRENT USE OF INSULIN (H): ICD-10-CM

## 2017-03-13 LAB
ALBUMIN SERPL-MCNC: 3.8 G/DL (ref 3.4–5)
ALP SERPL-CCNC: 101 U/L (ref 40–150)
ALT SERPL W P-5'-P-CCNC: 31 U/L (ref 0–70)
ANION GAP SERPL CALCULATED.3IONS-SCNC: 9 MMOL/L (ref 3–14)
AST SERPL W P-5'-P-CCNC: 16 U/L (ref 0–45)
BASOPHILS # BLD AUTO: 0 10E9/L (ref 0–0.2)
BASOPHILS NFR BLD AUTO: 0.3 %
BILIRUB DIRECT SERPL-MCNC: 0.1 MG/DL (ref 0–0.2)
BILIRUB SERPL-MCNC: 0.5 MG/DL (ref 0.2–1.3)
BUN SERPL-MCNC: 9 MG/DL (ref 7–30)
CALCIUM SERPL-MCNC: 8 MG/DL (ref 8.5–10.1)
CHLORIDE SERPL-SCNC: 106 MMOL/L (ref 94–109)
CHOLEST SERPL-MCNC: 152 MG/DL
CO2 SERPL-SCNC: 24 MMOL/L (ref 20–32)
CREAT SERPL-MCNC: 1.06 MG/DL (ref 0.66–1.25)
CREAT UR-MCNC: 86 MG/DL
DIFFERENTIAL METHOD BLD: NORMAL
EOSINOPHIL # BLD AUTO: 0.1 10E9/L (ref 0–0.7)
EOSINOPHIL NFR BLD AUTO: 1.2 %
ERYTHROCYTE [DISTWIDTH] IN BLOOD BY AUTOMATED COUNT: 12.8 % (ref 10–15)
EST. AVERAGE GLUCOSE BLD GHB EST-MCNC: 143 MG/DL
GFR SERPL CREATININE-BSD FRML MDRD: 75 ML/MIN/1.7M2
GLUCOSE SERPL-MCNC: 101 MG/DL (ref 70–99)
HBA1C MFR BLD: 6.6 % (ref 4.3–6)
HCT VFR BLD AUTO: 41.7 % (ref 40–53)
HDLC SERPL-MCNC: 36 MG/DL
HGB BLD-MCNC: 14.5 G/DL (ref 13.3–17.7)
IMM GRANULOCYTES # BLD: 0 10E9/L (ref 0–0.4)
IMM GRANULOCYTES NFR BLD: 0.2 %
LDLC SERPL CALC-MCNC: 98 MG/DL
LYMPHOCYTES # BLD AUTO: 1.9 10E9/L (ref 0.8–5.3)
LYMPHOCYTES NFR BLD AUTO: 32 %
MCH RBC QN AUTO: 31.1 PG (ref 26.5–33)
MCHC RBC AUTO-ENTMCNC: 34.8 G/DL (ref 31.5–36.5)
MCV RBC AUTO: 90 FL (ref 78–100)
MICROALBUMIN UR-MCNC: <5 MG/L
MICROALBUMIN/CREAT UR: NORMAL MG/G CR (ref 0–17)
MONOCYTES # BLD AUTO: 0.4 10E9/L (ref 0–1.3)
MONOCYTES NFR BLD AUTO: 6.3 %
NEUTROPHILS # BLD AUTO: 3.6 10E9/L (ref 1.6–8.3)
NEUTROPHILS NFR BLD AUTO: 60 %
NONHDLC SERPL-MCNC: 116 MG/DL
NRBC # BLD AUTO: 0 10*3/UL
NRBC BLD AUTO-RTO: 0 /100
PLATELET # BLD AUTO: 216 10E9/L (ref 150–450)
POTASSIUM SERPL-SCNC: 3.6 MMOL/L (ref 3.4–5.3)
PROT SERPL-MCNC: 7.3 G/DL (ref 6.8–8.8)
RBC # BLD AUTO: 4.66 10E12/L (ref 4.4–5.9)
SODIUM SERPL-SCNC: 139 MMOL/L (ref 133–144)
TRIGL SERPL-MCNC: 92 MG/DL
WBC # BLD AUTO: 6 10E9/L (ref 4–11)

## 2017-03-13 PROCEDURE — 80061 LIPID PANEL: CPT | Performed by: PHYSICIAN ASSISTANT

## 2017-03-13 PROCEDURE — 82043 UR ALBUMIN QUANTITATIVE: CPT | Performed by: PHYSICIAN ASSISTANT

## 2017-03-13 PROCEDURE — 83036 HEMOGLOBIN GLYCOSYLATED A1C: CPT | Performed by: PHYSICIAN ASSISTANT

## 2017-03-13 PROCEDURE — 36415 COLL VENOUS BLD VENIPUNCTURE: CPT | Performed by: PHYSICIAN ASSISTANT

## 2017-03-13 PROCEDURE — 82248 BILIRUBIN DIRECT: CPT | Performed by: PHYSICIAN ASSISTANT

## 2017-03-13 PROCEDURE — 85025 COMPLETE CBC W/AUTO DIFF WBC: CPT | Performed by: PHYSICIAN ASSISTANT

## 2017-03-13 PROCEDURE — 80053 COMPREHEN METABOLIC PANEL: CPT | Performed by: PHYSICIAN ASSISTANT

## 2017-03-30 ENCOUNTER — HOSPITAL ENCOUNTER (EMERGENCY)
Facility: HOSPITAL | Age: 47
Discharge: HOME | End: 2017-03-30
Attending: NURSE PRACTITIONER | Admitting: NURSE PRACTITIONER
Payer: COMMERCIAL

## 2017-03-30 VITALS
TEMPERATURE: 99.4 F | RESPIRATION RATE: 116 BRPM | SYSTOLIC BLOOD PRESSURE: 139 MMHG | OXYGEN SATURATION: 95 % | DIASTOLIC BLOOD PRESSURE: 84 MMHG

## 2017-03-30 DIAGNOSIS — J06.9 VIRAL URI WITH COUGH: ICD-10-CM

## 2017-03-30 LAB
FLUAV+FLUBV AG SPEC QL: NEGATIVE
FLUAV+FLUBV AG SPEC QL: NORMAL
SPECIMEN SOURCE: NORMAL

## 2017-03-30 PROCEDURE — 99213 OFFICE O/P EST LOW 20 MIN: CPT

## 2017-03-30 PROCEDURE — 87804 INFLUENZA ASSAY W/OPTIC: CPT | Mod: 59 | Performed by: FAMILY MEDICINE

## 2017-03-30 PROCEDURE — 99213 OFFICE O/P EST LOW 20 MIN: CPT | Performed by: NURSE PRACTITIONER

## 2017-03-30 ASSESSMENT — ENCOUNTER SYMPTOMS
FEVER: 1
COUGH: 1
RHINORRHEA: 1
HEADACHES: 1
NAUSEA: 0
APPETITE CHANGE: 0
MYALGIAS: 1
CARDIOVASCULAR NEGATIVE: 1
SORE THROAT: 1
EYES NEGATIVE: 1
VOMITING: 0
ACTIVITY CHANGE: 1
DIARRHEA: 0

## 2017-03-30 NOTE — ED AVS SNAPSHOT
[facility]    [address]    [place] [address]    Phone:  [phone]                                       Pierre Sharma   MRN: [id number]    Department:  [facility]   Date of Visit:  3/30/2017           Patient Information     Date Of Birth          1970        Your diagnoses for this visit were:     Viral URI with cough        You were seen by Izzy Conway NP.      Follow-up Information     Follow up with Dayna Cronin PA.    Specialty:  Family Practice    Why:  As needed, If symptoms worsen    Contact information:    [facility]  [address]  [place] [address] [phone]          Follow up with [facility].    Specialty:  EMERGENCY MEDICINE    Why:  As needed, If symptoms worsen    Contact information:    [address]  [place] [address] [phone]    Additional information:    From [place]: Take [address]. Turn left at [address]. Turn left at the first stoplight on [address]. At the first stop sign, take a right onto [address]. Take a left into the parking lot and continue through until you reach the North enterance of the building.       From [place]: Take [address]. Take the [address] ramp towards [place]. Turn left onto [address]. Take a slight right onto [address]. Turn left at the first stoplight on [address]. At the first stop sign, take a right onto [address]. Take a left into the parking lot and continue through until you reach the North enterance of the building.       From [place]: Take [address]. Take a right at [address]. At the first stop sign, take a right onto [address]. Take a left into the parking lot and continue through until you reach the North enterance of the building.         Discharge Instructions         Viral Upper Respiratory Illness (Adult)  You have a viral upper  respiratory illness (URI), which is another term for the common cold. This illness is contagious during the first few days. It is spread through the air by coughing and sneezing. It may also be spread by direct contact (touching the sick person and then touching your own eyes, nose, or mouth). Frequent handwashing will decrease risk of spread. Most viral illnesses go away within 7 to 10 days with rest and simple home remedies. Sometimes the illness may last for several weeks. Antibiotics will not kill a virus, and they are generally not prescribed for this condition.    Home care    If symptoms are severe, rest at home for the first 2 to 3 days. When you resume activity, don't let yourself get too tired.    Avoid being exposed to cigarette smoke (yours or others ).    You may use acetaminophen or ibuprofen to control pain and fever, unless another medicine was prescribed. (Note: If you have chronic liver or kidney disease, have ever had a stomach ulcer or gastrointestinal bleeding, or are taking blood-thinning medicines, talk with your healthcare provider before using these medicines.) Aspirin should never be given to anyone under 18 years of age who is ill with a viral infection or fever. It may cause severe liver or brain damage.    Your appetite may be poor, so a light diet is fine. Avoid dehydration by drinking 6 to 8 glasses of fluids per day (water, soft drinks, juices, tea, or soup). Extra fluids will help loosen secretions in the nose and lungs.    Over-the-counter cold medicines will not shorten the length of time you re sick, but they may be helpful for the following symptoms: cough, sore throat, and nasal and sinus congestion. (Note: Do not use decongestants if you have high blood pressure.)  Follow-up care  Follow up with your healthcare provider, or as advised.  When to seek medical advice  Call your healthcare provider right away if any of these occur:    Cough with lots of colored sputum  (mucus)    Severe headache; face, neck, or ear pain    Difficulty swallowing due to throat pain    Fever of 100.4 F (38 C)  Call 911, or get immediate medical care  Call emergency services right away if any of these occur:    Chest pain, shortness of breath, wheezing, or difficulty breathing    Coughing up blood    Inability to swallow due to throat pain    1337-5854 [employer]. [address] [place] [address]. All rights reserved. This information is not intended as a substitute for professional medical care. Always follow your healthcare professional's instructions.      Continue the mucinex for expectorating    Future Appointments        Provider Department Dept Phone Center    4/18/2017 9:45 AM H Manjit Hernandez MD [facility] [phone] [place]         Review of your medicines      Our records show that you are taking the medicines listed below. If these are incorrect, please call your family doctor or clinic.        Dose / Directions Last dose taken    fexofenadine 180 MG tablet   Commonly known as:  ALLEGRA   Dose:  180 mg   Quantity:  30 tablet        Take 1 tablet (180 mg) by mouth daily   Refills:  1        glimepiride 2 MG tablet   Commonly known as:  AMARYL   Dose:  2 mg   Quantity:  90 tablet        Take 1 tablet (2 mg) by mouth every morning (before breakfast)   Refills:  1        IBUPROFEN PO   Dose:  800 mg        Take 800 mg by mouth every 8 hours as needed for moderate pain   Refills:  0        linagliptin 5 MG Tabs tablet   Commonly known as:  TRADJENTA   Dose:  5 mg   Quantity:  90 tablet        Take 1 tablet (5 mg) by mouth daily   Refills:  3        lisinopril 5 MG tablet   Commonly known as:  PRINIVIL/ZESTRIL   Dose:  5 mg   Quantity:  90 tablet        Take 1 tablet (5 mg) by mouth daily   Refills:  1        metFORMIN 1000 MG tablet   Commonly known as:  GLUCOPHAGE   Quantity:  60 tablet        TAKE 1 TABLET BY MOUTH TWICE DAILY WITH MEALS  "  Refills:  2        SitaGLIPtin-MetFORMIN HCl  MG Tb24   Commonly known as:  JANUMET XR   Dose:  1 tablet   Quantity:  90 tablet        Take 1 tablet by mouth daily   Refills:  1                Procedures and tests performed during your visit     Influenza A/B antigen      Orders Needing Specimen Collection     None      Pending Results     No orders found from 3/28/2017 to 3/31/2017.            Pending Culture Results     No orders found from 3/28/2017 to 3/31/2017.            Thank you for choosing [facility]       Thank you for choosing [facility] for your care. Our goal is always to provide you with excellent care. Hearing back from our patients is one way we can continue to improve our services. Please take a few minutes to complete the written survey that you may receive in the mail after you visit with us. Thank you!        [employer] Information     [employer] lets you send messages to your doctor, view your test results, renew your prescriptions, schedule appointments and more. To sign up, go to [email or web address] . Click on \"Log in\" on the left side of the screen, which will take you to the Welcome page. Then click on \"Sign up Now\" on the right side of the page.     You will be asked to enter the access code listed below, as well as some personal information. Please follow the directions to create your username and password.     Your access code is: [email or web address]  Expires: 2017  5:54 PM     Your access code will  in 90 days. If you need help or a new code, please call your [facility] clinic or [phone].        Care EveryWhere ID     This is your Care EveryWhere ID. This could be used by other organizations to access your [facility] medical records  [id number]        After Visit Summary       This is your record. Keep this with you and show to your community pharmacist(s) and doctor(s) at your next visit.                  "

## 2017-03-30 NOTE — ED AVS SNAPSHOT
[facility]    [address] [address]    Phone:  [phone]                                       Pierre Sharma   MRN: [id number]    Department:  [facility]   Date of Visit:  3/30/2017           After Visit Summary Signature Page     I have received my discharge instructions, and my questions have been answered. I have discussed any challenges I see with this plan with the nurse or doctor.    ..........................................................................................................................................  Patient/Patient Representative Signature      ..........................................................................................................................................  Patient Representative Print Name and Relationship to Patient    ..................................................               ................................................  Date                                            Time    ..........................................................................................................................................  Reviewed by Signature/Title    ...................................................              ..............................................  Date                                                            Time

## 2017-03-30 NOTE — LETTER
[facility]  [address] [address]  Phone: [phone]    March 30, 2017        Pierre MYERS  [facility] [address]          To whom it may concern:    RE: Pierre Sharma    Patient was seen and treated today at our clinic and missed work.  He was out of work 3/29-30/2017    Please contact me for questions or concerns.      Sincerely,        Izzy Conway CNP

## 2017-03-31 NOTE — ED PROVIDER NOTES
History     Chief Complaint   Patient presents with     URI     cough and body aches     The history is provided by the patient. No  was used.     Pierre Sharma is a 46 year old male who presents with 3 days of URI sx. With cough.  Has had temp max 101.4 . Today. Had influenza vaccine this year. Taking fluids well.           I have reviewed the Medications, Allergies, Past Medical and Surgical History, and Social History in the Epic system.    Review of Systems   Constitutional: Positive for activity change and fever. Negative for appetite change.   HENT: Positive for congestion, rhinorrhea and sore throat.    Eyes: Negative.    Respiratory: Positive for cough (productive to yellow phlegm).    Cardiovascular: Negative.    Gastrointestinal: Negative for diarrhea, nausea and vomiting.   Genitourinary: Negative.    Musculoskeletal: Positive for myalgias.   Skin: Negative for rash.   Neurological: Positive for headaches.   Hematological: Negative for adenopathy.       Physical Exam   BP: 139/84  Heart Rate: 90  Temp: 99.4  F (37.4  C)  Resp: 16  SpO2: 95 % recheck of 98%  Physical Exam   Constitutional: He is oriented to person, place, and time. He appears well-developed and well-nourished. No distress.   HENT:   Head: Normocephalic and atraumatic.   Right Ear: External ear normal.   Left Ear: External ear normal.   Mouth/Throat: Oropharynx is clear and moist.   Nasal mucosa erythematous, mucopurulent drainage present    Eyes: Conjunctivae and EOM are normal. Pupils are equal, round, and reactive to light. Right eye exhibits no discharge. Left eye exhibits no discharge. No scleral icterus.   Neck: Normal range of motion. Neck supple.   Cardiovascular: Normal rate, regular rhythm and normal heart sounds.  Exam reveals no gallop and no friction rub.    No murmur heard.  Pulmonary/Chest: Effort normal and breath sounds normal. No respiratory distress. He has no wheezes. He has no rales.    Abdominal: Soft. Bowel sounds are normal. There is no tenderness. There is no rebound and no guarding.   Musculoskeletal: Normal range of motion.   Lymphadenopathy:     He has no cervical adenopathy.   Neurological: He is alert and oriented to person, place, and time.   Skin: Skin is warm and dry. No rash noted. He is not diaphoretic.   Nursing note and vitals reviewed.      ED Course     ED Course     Procedures               Labs Ordered and Resulted from Time of ED Arrival Up to the Time of Departure from the ED   INFLUENZA A/B ANTIGEN     Negative results discussed  Assessments & Plan (with Medical Decision Making)     I have reviewed the nursing notes.    I have reviewed the findings, diagnosis, plan and need for follow up with the patient.    Pathophysiology, possible etiology and treatment with potential outcomes, risks, benefits, and alternatives discussed to the best of my ability      Ibuprofen for fever and discomfort 800 mg TID stop any stomach upset.  Mucinex as desired    Discharge Medication List as of 3/30/2017  8:17 PM          Final diagnoses:   Viral URI with cough       3/30/2017   [facility]     Izzy Conway NP  04/03/17 [id number]

## 2017-03-31 NOTE — ED NOTES
Pt reports symptoms of chest congestion and coughing, throat pain with coughing started Monday, fever started Tuesday. gen'l body and headache. Low back and joint discomfort. mucinex this am and ibuprofen po 800mg last at 1630.

## 2017-03-31 NOTE — DISCHARGE INSTRUCTIONS

## 2017-04-03 ASSESSMENT — ENCOUNTER SYMPTOMS: ADENOPATHY: 0

## 2017-06-27 ENCOUNTER — TELEPHONE (OUTPATIENT)
Dept: EDUCATION SERVICES | Facility: HOSPITAL | Age: 47
End: 2017-06-27

## 2017-06-27 NOTE — TELEPHONE ENCOUNTER
12:26 PM    Reason for Call: Phone Call    Description: Patient's spouse, Pierre, calling in regards to patient's insulin stating the one she is currently on is not working and she has gained weight and she would like to go back on Lantus if possible. Patient uses [facility].     Was an appointment offered for this call? Yes Mehrdad 07/07    Preferred method for responding to this message: Telephone Call    If we cannot reach you directly, may we leave a detailed response at the number you provided? Yes    Can this message wait until your PCP/provider returns, if available today? Tamiko,     Lucia Almaraz

## 2017-07-03 DIAGNOSIS — E11.9 TYPE 2 DIABETES MELLITUS WITHOUT COMPLICATION, WITHOUT LONG-TERM CURRENT USE OF INSULIN (H): ICD-10-CM

## 2017-07-05 DIAGNOSIS — E11.8 TYPE 2 DIABETES MELLITUS WITH COMPLICATION, WITHOUT LONG-TERM CURRENT USE OF INSULIN (H): ICD-10-CM

## 2017-07-05 RX ORDER — GLIMEPIRIDE 2 MG/1
TABLET ORAL
Qty: 90 TABLET | Refills: 0 | Status: SHIPPED | OUTPATIENT
Start: 2017-07-05 | End: 2017-09-28

## 2017-07-05 NOTE — TELEPHONE ENCOUNTER
Amaryl         Last Written Prescription Date: 1/9/17  Last Fill Quantity: 90, # refills: 0  Last Office Visit with [facility], [facility] or  Health prescribing provider:  3/10/17   Next 5 appointments (look out 90 days)     Jul 07, 2017  1:15 PM CDT   (Arrive by 1:00 PM)   SHORT with CHANDLER Jennings   [facility] [place] ([facility] [place] )    [address]  [place] [address]   [phone]                   BP Readings from Last 3 Encounters:   03/30/17 139/84   03/10/17 122/76   01/09/17 123/80     Lab Results   Component Value Date    MICROL <5 03/13/2017     Lab Results   Component Value Date    UMALCR Unable to calculate due to low value 03/13/2017     Creatinine   Date Value Ref Range Status   03/13/2017 1.06 0.66 - 1.25 mg/dL Final   ]  GFR Estimate   Date Value Ref Range Status   03/13/2017 75 >60 mL/min/1.7m2 Final     Comment:     Non  GFR Calc     GFR Estimate If Black   Date Value Ref Range Status   03/13/2017 >90   GFR Calc   >60 mL/min/1.7m2 Final     Lab Results   Component Value Date    CHOL 152 03/13/2017     Lab Results   Component Value Date    HDL 36 03/13/2017     Lab Results   Component Value Date    LDL 98 03/13/2017     Lab Results   Component Value Date    TRIG 92 03/13/2017     No results found for: CHOLHDLRATIO  Lab Results   Component Value Date    AST 16 03/13/2017     Lab Results   Component Value Date    ALT 31 03/13/2017     Lab Results   Component Value Date    A1C 6.6 03/13/2017     Potassium   Date Value Ref Range Status   03/13/2017 3.6 3.4 - 5.3 mmol/L Final

## 2017-07-07 ENCOUNTER — OFFICE VISIT (OUTPATIENT)
Dept: FAMILY MEDICINE | Facility: OTHER | Age: 47
End: 2017-07-07
Attending: PHYSICIAN ASSISTANT
Payer: COMMERCIAL

## 2017-07-07 VITALS
HEIGHT: 67 IN | OXYGEN SATURATION: 98 % | SYSTOLIC BLOOD PRESSURE: 122 MMHG | WEIGHT: 175 LBS | TEMPERATURE: 97 F | DIASTOLIC BLOOD PRESSURE: 78 MMHG | BODY MASS INDEX: 27.47 KG/M2 | HEART RATE: 65 BPM

## 2017-07-07 DIAGNOSIS — E11.8 TYPE 2 DIABETES MELLITUS WITH COMPLICATION, WITHOUT LONG-TERM CURRENT USE OF INSULIN (H): ICD-10-CM

## 2017-07-07 PROCEDURE — 99214 OFFICE O/P EST MOD 30 MIN: CPT | Performed by: PHYSICIAN ASSISTANT

## 2017-07-07 RX ORDER — LISINOPRIL 5 MG/1
5 TABLET ORAL DAILY
Qty: 90 TABLET | Refills: 1 | Status: SHIPPED | OUTPATIENT
Start: 2017-07-07 | End: 2018-02-13

## 2017-07-07 ASSESSMENT — PAIN SCALES - GENERAL: PAINLEVEL: NO PAIN (0)

## 2017-07-07 NOTE — NURSING NOTE
"Chief Complaint   Patient presents with     Diabetes     Follow UP       Initial /78 (BP Location: Left arm, Patient Position: Chair, Cuff Size: Adult Regular)  Pulse 65  Temp 97  F (36.1  C) (Tympanic)  Ht 5' 6.5\" (1.689 m)  Wt 175 lb (79.4 kg)  SpO2 98%  BMI 27.82 kg/m2 Estimated body mass index is 27.82 kg/(m^2) as calculated from the following:    Height as of this encounter: 5' 6.5\" (1.689 m).    Weight as of this encounter: 175 lb (79.4 kg).  Medication Reconciliation: complete   Yani Carlin LPN    "

## 2017-07-07 NOTE — MR AVS SNAPSHOT
After Visit Summary   7/7/2017    Pierre Sharma    MRN: [id number]           Patient Information     Date Of Birth          1970        Visit Information        Provider Department      7/7/2017 1:15 PM Dayna Cronin PA [facility]        Today's Diagnoses     Type 2 diabetes mellitus with complication, without long-term current use of insulin (H)        Type 2 diabetes mellitus without complication, without long-term current use of insulin (H)          Care Instructions      Thank you for choosing [facility].   I have office hours 8:00 am to 4:30 pm on Monday's, Wednesday's, Thursday's and Friday's. My nurse and I are out of the office every Tuesday.    Following your visit, when your labs and diagnostic testing have returned, I will review then and you will be contacted by my nurse.  If you are on My Chart, you can also view results there.    For refills, notify your pharmacy regarding what you need and the pharmacy will generate a refill request. Do not call my nurse as she is unable to process refill request. Please plan ahead and allow 3-5 days for refill requests.    You will generally receive a reminder call the day prior to your appointment.  If you cannot attend your appointment, please cancel your appointment with as much notice as possible.  If there is a pattern of failure to present for your appointments, I cannot provide consistent, meaningful, ongoing care for you. It is very important to me that you come in for your care, so we can best assist you with your health care needs.    IMPORTANT:  Please note that it is my standard of practice to NOT participate in prescribing ongoing requested Narcotic Analgesic therapy, and/or participate in the prescribing of other controlled substances.  My nurse and I am happy to assist you with the process of referral for alternative pain management as needed, and other treatment modalities including but not limited  "to:  Physical Therapy, Physical Medicine and Rehab, Counseling, Chiropractic Care, Orthopedic Care, and non-narcotic medication management.     In the event that you need to be seen for emergent concerns and I am out of office,  please see one of my colleagues for acute concerns.  You may also present to  or ER.  I appreciate the opportunity to serve you and look forward to supporting your healthcare needs in the future. Please contact me with any questions or concerns that you may have.    Sincerely,      Dayna Cronin RN, PA-C               Follow-ups after your visit        Who to contact     If you have questions or need follow up information about today's clinic visit or your schedule please contact [facility] directly at [phone].  Normal or non-critical lab and imaging results will be communicated to you by [employer], letter or phone within 4 business days after the clinic has received the results. If you do not hear from us within 7 days, please contact the clinic through [employer] or phone. If you have a critical or abnormal lab result, we will notify you by phone as soon as possible.  Submit refill requests through [employer] or call your pharmacy and they will forward the refill request to us. Please allow 3 business days for your refill to be completed.          Additional Information About Your Visit        [employer] Information     [employer] lets you send messages to your doctor, view your test results, renew your prescriptions, schedule appointments and more. To sign up, go to [email or web address] . Click on \"Log in\" on the left side of the screen, which will take you to the Welcome page. Then click on \"Sign up Now\" on the right side of the page.     You will be asked to enter the access code listed below, as well as some personal information. Please follow the directions to create your username and password.     Your access code is: [email or web address]  Expires: 10/5/2017  1:51 PM     Your " "access code will  in 90 days. If you need help or a new code, please call your [facility] clinic or [phone].        Care EveryWhere ID     This is your Care EveryWhere ID. This could be used by other organizations to access your [facility] medical records  [id number]        Your Vitals Were     Pulse Temperature Height Pulse Oximetry BMI (Body Mass Index)       65 97  F (36.1  C) (Tympanic) 5' 6.5\" (1.689 m) 98% 27.82 kg/m2        Blood Pressure from Last 3 Encounters:   17 122/78   17 139/84   03/10/17 122/76    Weight from Last 3 Encounters:   17 175 lb (79.4 kg)   03/10/17 177 lb (80.3 kg)   17 180 lb (81.6 kg)              Today, you had the following     No orders found for display         Where to get your medicines      These medications were sent to [facility] [id number] [place] [address], [place] - [address] AT [facility] [address]  [address], [facility] [address]     Phone:  [phone]     lisinopril 5 MG tablet    metFORMIN 1000 MG tablet          Primary Care Provider Office Phone # Fax #    CHANDLER Jennings [phone]       [facility] [address]  [facility] [address]        Equal Access to Services     [facility] AH: Hadii aad ku hadasho Soomaali, waaxda luqadaha, qaybta kaalmada adeegyada, waxay idiin hayaan jenny jones . So [facility] [phone].    ATENCIÓN: Si habla español, tiene a song disposición servicios gratuitos de asistencia lingüística. Llame al [phone].    We comply with applicable federal civil rights laws and [place] laws. We do not discriminate on the basis of race, color, national origin, age, disability sex, sexual orientation or gender identity.            Thank you!     Thank you for choosing [facility]  for your care. Our goal is always to provide you with excellent care. Hearing back from our patients is one way we can continue to improve our services. Please take a few minutes " to complete the written survey that you may receive in the mail after your visit with us. Thank you!             Your Updated Medication List - Protect others around you: Learn how to safely use, store and throw away your medicines at www.disposemymeds.org.          This list is accurate as of: 7/7/17  1:57 PM.  Always use your most recent med list.                   Brand Name Dispense Instructions for use Diagnosis    fexofenadine 180 MG tablet    ALLEGRA    30 tablet    Take 1 tablet (180 mg) by mouth daily        glimepiride 2 MG tablet    AMARYL    90 tablet    TAKE 1 TABLET BY MOUTH EVERY MORNING BEFORE BREAKFAST    Type 2 diabetes mellitus without complication, without long-term current use of insulin (H)       IBUPROFEN PO      Take 800 mg by mouth every 8 hours as needed for moderate pain        linagliptin 5 MG Tabs tablet    TRADJENTA    90 tablet    Take 1 tablet (5 mg) by mouth daily    Type 2 diabetes mellitus with complication, without long-term current use of insulin (H)       lisinopril 5 MG tablet    PRINIVIL/ZESTRIL    90 tablet    Take 1 tablet (5 mg) by mouth daily    Type 2 diabetes mellitus without complication, without long-term current use of insulin (H)       metFORMIN 1000 MG tablet    GLUCOPHAGE    60 tablet    TAKE 1 TABLET BY MOUTH TWICE DAILY WITH MEALS    Type 2 diabetes mellitus with complication, without long-term current use of insulin (H)

## 2017-07-07 NOTE — PATIENT INSTRUCTIONS
Thank you for choosing [facility].   I have office hours 8:00 am to 4:30 pm on Monday's, Wednesday's, Thursday's and Friday's. My nurse and I are out of the office every Tuesday.    Following your visit, when your labs and diagnostic testing have returned, I will review then and you will be contacted by my nurse.  If you are on My Chart, you can also view results there.    For refills, notify your pharmacy regarding what you need and the pharmacy will generate a refill request. Do not call my nurse as she is unable to process refill request. Please plan ahead and allow 3-5 days for refill requests.    You will generally receive a reminder call the day prior to your appointment.  If you cannot attend your appointment, please cancel your appointment with as much notice as possible.  If there is a pattern of failure to present for your appointments, I cannot provide consistent, meaningful, ongoing care for you. It is very important to me that you come in for your care, so we can best assist you with your health care needs.    IMPORTANT:  Please note that it is my standard of practice to NOT participate in prescribing ongoing requested Narcotic Analgesic therapy, and/or participate in the prescribing of other controlled substances.  My nurse and I am happy to assist you with the process of referral for alternative pain management as needed, and other treatment modalities including but not limited to:  Physical Therapy, Physical Medicine and Rehab, Counseling, Chiropractic Care, Orthopedic Care, and non-narcotic medication management.     In the event that you need to be seen for emergent concerns and I am out of office,  please see one of my colleagues for acute concerns.  You may also present to  or ER.  I appreciate the opportunity to serve you and look forward to supporting your healthcare needs in the future. Please contact me with any questions or concerns that you may  have.    Sincerely,      Dayna Cronin RN, PA-C

## 2017-07-07 NOTE — PROGRESS NOTES
SUBJECTIVE:                                                    Pierre Sharma is a 47 year old male who presents to clinic today for the following health issues:      Diabetes Follow-up      Patient is checking blood sugars: 2-3 times a week    Diabetic concerns: None     Symptoms of hypoglycemia (low blood sugar): shaky, weak     Paresthesias (numbness or burning in feet) or sores: No     Date of last diabetic eye exam: January 2016      Amount of exercise or physical activity: 2-3 days/week for an average of 30-45 minutes    Problems taking medications regularly: No    Medication side effects: none    Diet: regular (no restrictions)          Problem list and histories reviewed & adjusted, as indicated.  Additional history: as documented    Patient Active Problem List   Diagnosis     ACP (advance care planning)     Type 2 diabetes mellitus with complication, without long-term current use of insulin (H)     Past Surgical History:   Procedure Laterality Date     CHOLECYSTECTOMY  2012       Social History   Substance Use Topics     Smoking status: Current Every Day Smoker     Packs/day: 0.05     Types: Cigarettes     Smokeless tobacco: Never Used      Comment: pt is cuting back on his own      Alcohol use Yes      Comment: occassional      Family History   Problem Relation Age of Onset     Angina Mother      Hypertension Mother      DIABETES Mother          Current Outpatient Prescriptions   Medication Sig Dispense Refill     metFORMIN (GLUCOPHAGE) 1000 MG tablet TAKE 1 TABLET BY MOUTH TWICE DAILY WITH MEALS 60 tablet 11     lisinopril (PRINIVIL/ZESTRIL) 5 MG tablet Take 1 tablet (5 mg) by mouth daily 90 tablet 1     glimepiride (AMARYL) 2 MG tablet TAKE 1 TABLET BY MOUTH EVERY MORNING BEFORE BREAKFAST 90 tablet 0     IBUPROFEN PO Take 800 mg by mouth every 8 hours as needed for moderate pain       linagliptin (TRADJENTA) 5 MG TABS tablet Take 1 tablet (5 mg) by mouth daily 90 tablet 3     fexofenadine (ALLEGRA)  "180 MG tablet Take 1 tablet (180 mg) by mouth daily 30 tablet 1     [DISCONTINUED] metFORMIN (GLUCOPHAGE) 1000 MG tablet TAKE 1 TABLET BY MOUTH TWICE DAILY WITH MEALS 60 tablet 2     [DISCONTINUED] lisinopril (PRINIVIL/ZESTRIL) 5 MG tablet Take 1 tablet (5 mg) by mouth daily 90 tablet 1     Allergies   Allergen Reactions     Sulfa Drugs      rash     BP Readings from Last 3 Encounters:   07/07/17 122/78   03/30/17 139/84   03/10/17 122/76    Wt Readings from Last 3 Encounters:   07/07/17 175 lb (79.4 kg)   03/10/17 177 lb (80.3 kg)   01/09/17 180 lb (81.6 kg)                    Reviewed and updated as needed this visit by clinical staff  Tobacco  Allergies  Meds  Med Hx  Surg Hx  Fam Hx  Soc Hx      Reviewed and updated as needed this visit by Provider         ROS:  Constitutional, neuro, ENT, endocrine, pulmonary, cardiac, gastrointestinal, genitourinary, musculoskeletal, integument and psychiatric systems are negative, except as otherwise noted.    OBJECTIVE:                                                    /78 (BP Location: Left arm, Patient Position: Chair, Cuff Size: Adult Regular)  Pulse 65  Temp 97  F (36.1  C) (Tympanic)  Ht 5' 6.5\" (1.689 m)  Wt 175 lb (79.4 kg)  SpO2 98%  BMI 27.82 kg/m2  Body mass index is 27.82 kg/(m^2).  GENERAL APPEARANCE: healthy, alert and no distress  EYES: Eyes grossly normal to inspection, PERRL and conjunctivae and sclerae normal  HENT: ear canals and TM's normal and nose and mouth without ulcers or lesions  NECK: no adenopathy, no asymmetry, masses, or scars and thyroid normal to palpation  RESP: lungs clear to auscultation - no rales, rhonchi or wheezes  CV: regular rates and rhythm, normal S1 S2, no S3 or S4 and no murmur, click or rub  LYMPHATICS: normal ant/post cervical and supraclavicular nodes  ABDOMEN: soft, nontender, without hepatosplenomegaly or masses and bowel sounds normal  MS: extremities normal- no gross deformities noted  SKIN: no suspicious " lesions or rashes  NEURO: Normal strength and tone, mentation intact and speech normal  DIABETIC FOOT EXAM: normal DP and PT pulses, no trophic changes or ulcerative lesions and normal sensory exam  PSYCH: mentation appears normal and affect normal/bright    Diagnostic test results:  Diagnostic Test Results:  No results found for this or any previous visit (from the past 24 hour(s)).       ASSESSMENT/PLAN:                                                    1. Type 2 diabetes mellitus with complication, without long-term current use of insulin (H)  Doing very well. Due for labs in September and will return for those his weight loss is significant. His dietary habits and exercise habits are changing.  He is really been taking this to heart and wants to get off his medications.   Eye exam is scheduled. They do much of their care in [place] and brings to me the results but now not going this summer so we will have him get much of his care here.   - metFORMIN (GLUCOPHAGE) 1000 MG tablet; TAKE 1 TABLET BY MOUTH TWICE DAILY WITH MEALS  Dispense: 60 tablet; Refill: 11        2. Type 2 diabetes mellitus without complication, without long-term current use of insulin (H)  Needing to add in ACE.  Made aware of why.  When we check labs in December we will add in his kidney functions.   - lisinopril (PRINIVIL/ZESTRIL) 5 MG tablet; Take 1 tablet (5 mg) by mouth daily  Dispense: 90 tablet; Refill: 1      See Patient Instructions    CHANDLER Alamo  [facility] GIRISH

## 2017-09-28 DIAGNOSIS — E11.9 TYPE 2 DIABETES MELLITUS WITHOUT COMPLICATION, WITHOUT LONG-TERM CURRENT USE OF INSULIN (H): ICD-10-CM

## 2017-09-29 RX ORDER — GLIMEPIRIDE 2 MG/1
TABLET ORAL
Qty: 90 TABLET | Refills: 0 | Status: SHIPPED | OUTPATIENT
Start: 2017-09-29 | End: 2018-01-13

## 2017-09-29 NOTE — TELEPHONE ENCOUNTER
Glimepiride         Last Written Prescription Date: 7/5/17  Last Fill Quantity: 90, # refills: 0  [facility] Office [facility] with [facility] or [facility] prescribing provider:  7/7/17        BP Readings from Last 3 Encounters:   07/07/17 122/78   03/30/17 139/84   03/10/17 122/76     Lab Results   Component Value Date    MICROL <5 03/13/2017     Lab Results   Component Value Date    UMALCR Unable to calculate due to low value 03/13/2017     Creatinine   Date Value Ref Range Status   03/13/2017 1.06 0.66 - 1.25 mg/dL Final   ]  GFR Estimate   Date Value Ref Range Status   03/13/2017 75 >60 mL/min/1.7m2 Final     Comment:     Non  GFR Calc     GFR Estimate If Black   Date Value Ref Range Status   03/13/2017 >90   GFR Calc   >60 mL/min/1.7m2 Final     Lab Results   Component Value Date    CHOL 152 03/13/2017     Lab Results   Component Value Date    HDL 36 03/13/2017     Lab Results   Component Value Date    LDL 98 03/13/2017     Lab Results   Component Value Date    TRIG 92 03/13/2017     No results found for: CHOLHDLRATIO  Lab Results   Component Value Date    AST 16 03/13/2017     Lab Results   Component Value Date    ALT 31 03/13/2017     Lab Results   Component Value Date    A1C 6.6 03/13/2017     Potassium   Date Value Ref Range Status   03/13/2017 3.6 3.4 - 5.3 mmol/L Final

## 2018-01-13 DIAGNOSIS — E11.9 TYPE 2 DIABETES MELLITUS WITHOUT COMPLICATION, WITHOUT LONG-TERM CURRENT USE OF INSULIN (H): ICD-10-CM

## 2018-01-16 RX ORDER — GLIMEPIRIDE 2 MG/1
TABLET ORAL
Qty: 30 TABLET | Refills: 0 | Status: SHIPPED | OUTPATIENT
Start: 2018-01-16 | End: 2018-02-13

## 2018-01-23 ENCOUNTER — TELEPHONE (OUTPATIENT)
Dept: FAMILY MEDICINE | Facility: OTHER | Age: 48
End: 2018-01-23

## 2018-01-23 NOTE — TELEPHONE ENCOUNTER
Called pt to inquire on his need.  He explains that he would like to personally talk with Dayna Cronin.  When told that she is out today, he talked about the immediate need being his wife, Nettie.  She will be out of Lantus in the next day or so.  He is not working, is out of insurance currently.  Wonders if there is a program to help with the cost of a Lantus refill.  Told pt that message will be sent to diabetes education as Nettie has been seen there.    He would still like to talk directly with Cherry.  Will send message.  Sharron Montanez

## 2018-01-24 NOTE — TELEPHONE ENCOUNTER
They need to come apply for care and can go to [employer] and they sign them up there for programs that are free.  Diabetic Ed can give a sample is they have one to tied them over. Given Gayatri the phone message from my desk and you can call them and speak with diabetic Ed to arrange.

## 2018-02-06 ENCOUNTER — TELEPHONE (OUTPATIENT)
Dept: FAMILY MEDICINE | Facility: OTHER | Age: 48
End: 2018-02-06

## 2018-02-06 NOTE — TELEPHONE ENCOUNTER
"2:19 PM    Reason for Call: Phone Call    Description: Pt called     Was an appointment offered for this call? {YES / NO:935434::\"Yes\"}  If yes : Appointment type              Date    Preferred method for responding to this message: {Contact Preference:[id number]}  What is your phone number ?    If we cannot reach you directly, may we leave a detailed response at the number you provided? {YES / NO:620446::\"Yes\"}    Can this message wait until your PCP/provider returns, if available today? {YES CAPS/NO SMALL:053133::\"***\"}    Luna Soto      "

## 2018-02-12 DIAGNOSIS — E11.8 TYPE 2 DIABETES MELLITUS WITH COMPLICATION, WITHOUT LONG-TERM CURRENT USE OF INSULIN (H): ICD-10-CM

## 2018-02-12 NOTE — TELEPHONE ENCOUNTER
Pt called for a refill.  Has coupon for rx.  Asked about code?  Unsure.  Put note for pharmacy with ICD code.  Sharron Montanez

## 2018-02-13 DIAGNOSIS — E11.9 TYPE 2 DIABETES MELLITUS WITHOUT COMPLICATION, WITHOUT LONG-TERM CURRENT USE OF INSULIN (H): ICD-10-CM

## 2018-02-13 DIAGNOSIS — E11.8 TYPE 2 DIABETES MELLITUS WITH COMPLICATION, WITHOUT LONG-TERM CURRENT USE OF INSULIN (H): ICD-10-CM

## 2018-02-13 NOTE — TELEPHONE ENCOUNTER
glimepiride (AMARYL) 2 MG tablet  Last Written Prescription Date:  1/16/18  Last Fill Quantity: 30,   # refills: 0  Last Office Visit: 7/7/17  Future Office visit:       lisinopril       Last Written Prescription Date:  7/7/17  Last Fill Quantity: 90,   # refills: 1  Last Office Visit: 7/7/17  Future Office visit:

## 2018-02-14 ENCOUNTER — TELEPHONE (OUTPATIENT)
Dept: FAMILY MEDICINE | Facility: OTHER | Age: 48
End: 2018-02-14

## 2018-02-14 NOTE — TELEPHONE ENCOUNTER
Returned pt phone call.  Pt is wanting information on the patient assistance program through [employer].  He would then qualify for free medications.  Talked with robinson about [employer]; cost with coupon/voucher would be $450/mthly for him, plus another $150 for Nettie.  Wonders if there is alternative rx?  [email or web address] to check on status of his paperwork.  Sharron Montanez    Called [employer].  They do have the application and additional information necessary.  It was pulled and will be processed.  If approved, will take approximately 5 days to ship out.  Called; talked and explained to [facility].  She states that she has enough Lantus, it is the Tradjenta that they both need.  Called ; they are unable to find application for either.  Will inform Pierre to go to [facility] on March 1 when Cherry is there.  Sharron Montanez

## 2018-02-14 NOTE — TELEPHONE ENCOUNTER
9:50 AM    Reason for Call: Phone Call    Description: Pt called and states that he would like to see if he could get a call back regarding some medication     Was an appointment offered for this call? No  If yes : Appointment type              Date    Preferred method for responding to this message: Telephone Call  What is your phone number ?    If we cannot reach you directly, may we leave a detailed response at the number you provided? Yes    Can this message wait until your PCP/provider returns, if available today? Not applicable, PCP is in     Luna Hickman

## 2018-02-15 RX ORDER — GLIMEPIRIDE 2 MG/1
TABLET ORAL
Qty: 30 TABLET | Refills: 0 | Status: SHIPPED | OUTPATIENT
Start: 2018-02-15 | End: 2018-03-22

## 2018-02-15 RX ORDER — LISINOPRIL 5 MG/1
5 TABLET ORAL DAILY
Qty: 90 TABLET | Refills: 0 | Status: SHIPPED | OUTPATIENT
Start: 2018-02-15 | End: 2018-07-12

## 2018-02-15 NOTE — TELEPHONE ENCOUNTER
[facility]ed [facility]; request [facility]AP applications.  Filled out our portion; will give to pt and his wife to complete and send in.  Sharron Montanez

## 2018-02-15 NOTE — TELEPHONE ENCOUNTER
Pt called and stated nurse was suppose to call him back today. Please call him back at [phone]

## 2018-02-16 ENCOUNTER — TELEPHONE (OUTPATIENT)
Dept: FAMILY MEDICINE | Facility: OTHER | Age: 48
End: 2018-02-16

## 2018-02-16 NOTE — TELEPHONE ENCOUNTER
Pt returned call; he is in [place].  Explained the [employer] PAP forms.  Pt will have Nettie sign for the both of them.  Advised using 2017 tax form for proof of income; as pt hasn't worked since July 2017.  His income will be less; and they will stand a better chance of getting PAP meds; no cost.  Also explained [facility]; to go in on 3/1 as CWallis will be the provider that night and can help with their needs until PAP is approved.  Nettie is at home; he will have her go to [employer].  Will call and remind her; give her address and hours.   Pt will come in when he returns on 3/3; bring 2017 taxes.  Sharron Montanez

## 2018-03-02 ENCOUNTER — TELEPHONE (OUTPATIENT)
Dept: FAMILY MEDICINE | Facility: OTHER | Age: 48
End: 2018-03-02

## 2018-03-02 NOTE — TELEPHONE ENCOUNTER
Talked with pt x 2 today.  Faxed Tradjenta prior auth this morning.  There weren't samples available at [facility].  Will check with  on Monday.  Sharron Montanez

## 2018-03-02 NOTE — TELEPHONE ENCOUNTER
9:00 AM    Reason for Call: Phone Call    Description: Pt is trying to fill out some paperwork for his wife. Requests a call back because he has a few questions on how to complete.    Was an appointment offered for this call? No  If yes : Appointment type              Date    Preferred method for responding to this message: Telephone Call  What is your phone number ? [phone]    If we cannot reach you directly, may we leave a detailed response at the number you provided? Yes    Can this message wait until your PCP/provider returns, if available today? Not applicable, provider is in    Jaydon Salazar

## 2018-03-02 NOTE — TELEPHONE ENCOUNTER
Called pt back.  Informed him that the Tradjenta applications and 2017 tax forms were faxed into  this morning.  He wonders on the Lantus.  Has not heard back from them.  Will check with Dayna; see what was offered at [facility] last night, also.  Sharron Montanez

## 2018-03-07 ENCOUNTER — TELEPHONE (OUTPATIENT)
Dept: FAMILY MEDICINE | Facility: OTHER | Age: 48
End: 2018-03-07

## 2018-03-07 NOTE — TELEPHONE ENCOUNTER
Patient is calling back and stated he was to get a call on Monday from nurse or provider and has not heard back. Please return patient's call in regards to a medication.

## 2018-03-08 NOTE — TELEPHONE ENCOUNTER
Called pt back.  Reported that info/application was faxed to [employer].  Have not heard back yet.  Checked with diabetes teaching; they do not have any samples to give.  Sharron Montanez

## 2018-03-22 DIAGNOSIS — E11.9 TYPE 2 DIABETES MELLITUS WITHOUT COMPLICATION, WITHOUT LONG-TERM CURRENT USE OF INSULIN (H): ICD-10-CM

## 2018-03-22 NOTE — LETTER
March 23, 2018      Pierre Sharma  [address] [place] [address]        Dear Pierre,     We are concerned about your health care.  We recently provided you with medication refills.  Lab tests are required every year in order to continue refilling your medications.  Orders have been placed in our computer and should be accessible at most [facility] labs. You WILL need to be fasting for this lab. Please complete this as soon as possible. If you have any questions please call us at [phone].      Sincerely,  CHANDLER Alamo

## 2018-03-22 NOTE — TELEPHONE ENCOUNTER
glimepiride (AMARYL) 2 MG tablet  Last Written Prescription Date:  2/15/18  Last Fill Quantity: 30,   # refills: 0  Last Office Visit: 7/7/17  Future Office visit:

## 2018-03-23 ENCOUNTER — TELEPHONE (OUTPATIENT)
Dept: FAMILY MEDICINE | Facility: OTHER | Age: 48
End: 2018-03-23

## 2018-03-23 NOTE — TELEPHONE ENCOUNTER
Requested Medications  glimepiride (AMARYL) 2 MG tablet  TAKE 1 TABLET BY MOUTH EVERY MORNING BEFORE BREAKFAST       Disp: 30 tablet Refills: 0    Class: E-Prescribe Start: 3/22/2018   For: Type 2 diabetes mellitus without complication, without long-term current use of insulin (H)  Originally ordered: 1 year ago by CHANDLER Jennings  Sulfonylurea Agents Failed3/22 9:26 AM   Blood pressure less than 140/90 in past 6 months    Patient has documented LDL within the past 12 mos.    Patient has had a Microalbumin in the past 12 mos.    Patient has documented A1c within the specified period of time.    Patient has a recent creatinine (normal) within the past 12 mos.    Recent (6 mo) or future (30 days) visit within the authorizing provider's specialty    Patient is age 18 or older     glimepiride (AMARYL) 2 MG tablet  Last Written Prescription Date:  2/15/18  Last Fill Quantity: 30,   # refills: 0  Last Office Visit: 7/7/17  Future Office visit:        SAMANTA santizo to advise. Letter sent to patient labs due. Thank you

## 2018-03-23 NOTE — TELEPHONE ENCOUNTER
Patient called and left a VM with me looking for an update on his medication refill I believe, he was very hard to understand.  Please advise and call patient back

## 2018-03-23 NOTE — TELEPHONE ENCOUNTER
Brisa, this patient is not known to diabetes ed, has never been seen or referred to the program.   But, we don't sample meds.     Thanks!  Ashley Hull RD, LD, CDE

## 2018-03-23 NOTE — TELEPHONE ENCOUNTER
Pt and wife, Nettie, were denied the PAP for Tradjenta 5 mg tab daily.  Checking for any clinic samples while the appeal process is pending.  Or any coupons?  Please advise.  Sharron Montanez

## 2018-03-25 RX ORDER — GLIMEPIRIDE 2 MG/1
TABLET ORAL
Qty: 30 TABLET | Refills: 0 | Status: SHIPPED | OUTPATIENT
Start: 2018-03-25 | End: 2018-03-29

## 2018-03-29 DIAGNOSIS — E11.9 TYPE 2 DIABETES MELLITUS WITHOUT COMPLICATION, WITHOUT LONG-TERM CURRENT USE OF INSULIN (H): ICD-10-CM

## 2018-03-29 RX ORDER — GLIMEPIRIDE 2 MG/1
TABLET ORAL
Qty: 30 TABLET | Refills: 0 | Status: SHIPPED | OUTPATIENT
Start: 2018-03-29 | End: 2018-06-01

## 2018-04-04 ENCOUNTER — TELEPHONE (OUTPATIENT)
Dept: FAMILY MEDICINE | Facility: OTHER | Age: 48
End: 2018-04-04

## 2018-04-04 NOTE — TELEPHONE ENCOUNTER
No not at this point.  Can try [facility] and see if they can get a suleiman.  Otherwise pay out of pocket.

## 2018-04-04 NOTE — TELEPHONE ENCOUNTER
3:11 PM    Reason for Call: Phone Call    Description: Pt called and states that he would like a call back regarding some information he would like to discuss.    Was an appointment offered for this call? No  If yes : Appointment type              Date    Preferred method for responding to this message: Telephone Call  What is your phone number ?[phone]    If we cannot reach you directly, may we leave a detailed response at the number you provided? Yes    Can this message wait until your PCP/provider returns, if available today? Not applicable, PCP is in     [facility]

## 2018-04-05 ENCOUNTER — TELEPHONE (OUTPATIENT)
Dept: FAMILY MEDICINE | Facility: OTHER | Age: 48
End: 2018-04-05

## 2018-04-05 NOTE — TELEPHONE ENCOUNTER
3:34 PM    Reason for Call: Phone Call    Description: Pt calling back and only wants call back from Nurse Martine has he has been working with her on this matter.    Was an appointment offered for this call? No  If yes : Appointment type              Date    Preferred method for responding to this message: Telephone Call  What is your phone number ?    If we cannot reach you directly, may we leave a detailed response at the number you provided? Yes    Can this message wait until your PCP/provider returns, if available today?     Ashley Mayo

## 2018-04-06 NOTE — TELEPHONE ENCOUNTER
Pierre was told to go to [facility] for their meds and per pt they have the meds,and was just checking  On the paperwork and told pt was faxed per Martine,and would like a call from Martine.Was informed Dayna was out of the office  And won't be back til Thursday and Martine was Floating and I was just returning a call.  [facility]   Medical Assistant

## 2018-04-06 NOTE — TELEPHONE ENCOUNTER
Pt would like a call back from a nurse working with Dayna Cronin (pt informed Dayna Cronin out until Thurs) or Rosi Rodriguez in Diabetic Ed (pt advised Rosi Rodriguez out until Monday). Please call him back at [phone]

## 2018-04-06 NOTE — TELEPHONE ENCOUNTER
Pt is calling again today to talk to Martine about the matter of his RX coverage, she is helping him with this. Message sent to pool for covering provider of Dayna Cronin

## 2018-04-06 NOTE — TELEPHONE ENCOUNTER
called and he's saying his wife Meghaemir is not working  And would like a call  From Rosi Rodriguez.  [facility]   Medical Assistant    Nettie's number : [phone]   's number [phone]

## 2018-04-11 ENCOUNTER — TELEPHONE (OUTPATIENT)
Dept: FAMILY MEDICINE | Facility: OTHER | Age: 48
End: 2018-04-11

## 2018-04-11 NOTE — TELEPHONE ENCOUNTER
10:26 AM    Reason for Call: Phone Call    Description: Pierre would like a call bact to see how paperwork is going for his prescriptions.  ( Insurance paperwork )  Please call Pierre    Was an appointment offered for this call?   No    Preferred method for responding to this message: [phone]    If we cannot reach you directly, may we leave a detailed response at the number you provided? Yes    Can this message wait until your PCP/provider returns, if available today? Yes    Shelby Figueroa

## 2018-04-11 NOTE — TELEPHONE ENCOUNTER
I do have a couple of vials of Lantus - but it would require [facility] to learn how to use a syringe and vial.    Also at [facility] ( I was there on 4/9) they currently have some one time samples of Tradjenta - 14 tablets in all and [facility] can have them, if she would like.    Please, would you contact Pierre and/or Nettie.

## 2018-04-12 ENCOUNTER — TELEPHONE (OUTPATIENT)
Dept: FAMILY MEDICINE | Facility: OTHER | Age: 48
End: 2018-04-12

## 2018-04-12 NOTE — TELEPHONE ENCOUNTER
Returned pt phone call.  Gave Rosi Rodriguez's input; Tradjenta at [facility]; Lantus vials here- but Nettie would need to be educated on how to use.   Pt will return call with Nettie's willingness to learn.  Also, pt inquired on the PAP for Tradjenta.  They were rejected based on income; but he could ask for an appeal with documentation which would require an explanation from both PCP and the Diabetic Ed depts.  He would like to pursue; to get Tradjenta at a reduced rate for both he and Nettie.  Pt will call back later today.  Will leave a message with [employer] to contact pt with response on their PAP program.  Sharron Montanez

## 2018-04-12 NOTE — TELEPHONE ENCOUNTER
I have been out all week.  Will certainly send her over there and we can try this but they have to work on buying at cost or set up a plan with a pharmacy.

## 2018-04-17 NOTE — TELEPHONE ENCOUNTER
Left message for the patient to return phone call back to clinic at earliest convenience.  Salma Collier CMA([facility])

## 2018-04-17 NOTE — TELEPHONE ENCOUNTER
Pt returned nurse call. He would like Martine with Dayna Cronin to call him back. He is aware she is not back in until tomorrow. Please call him back at [phone]

## 2018-04-18 NOTE — TELEPHONE ENCOUNTER
Contacted patient through another phone encounter.   Salma Collier CMA([facility])

## 2018-04-18 NOTE — TELEPHONE ENCOUNTER
Patient was contacted and stated that he was frustrated that Martine Ochoa was suppose to contact him in regards to the paperwork that was suppose to be sent into the pharmacy about 1 month . patient is also wondering if the paperwork was ever sent and if so what was the response. Patient did say [employer] and [employer] companies need more paperwork in order to process prior authorization for tradjenta medication. Patient would like Dayna santizo to review all phone calls and decide what further action needs to be taken. Feeligo did inform the patient that she would look for pervious paper work and touch base with Provider to see what steps needs to be taken next and will return phone call in the morning. Patient was satisfied with this response.   Salma Collier CMA([facility])

## 2018-04-18 NOTE — TELEPHONE ENCOUNTER
To: Dayna LUO  Please call the patient back @ [phone] as soon as you have a free moment :) Thank you

## 2018-04-19 DIAGNOSIS — E11.8 TYPE 2 DIABETES MELLITUS WITH COMPLICATION, WITHOUT LONG-TERM CURRENT USE OF INSULIN (H): ICD-10-CM

## 2018-04-20 ENCOUNTER — TELEPHONE (OUTPATIENT)
Dept: EDUCATION SERVICES | Facility: HOSPITAL | Age: 48
End: 2018-04-20

## 2018-04-20 ENCOUNTER — TELEPHONE (OUTPATIENT)
Dept: FAMILY MEDICINE | Facility: OTHER | Age: 48
End: 2018-04-20

## 2018-04-20 DIAGNOSIS — E11.9 TYPE 2 DIABETES MELLITUS WITHOUT COMPLICATION, WITHOUT LONG-TERM CURRENT USE OF INSULIN (H): Primary | ICD-10-CM

## 2018-04-20 NOTE — TELEPHONE ENCOUNTER
Called Pierre. He is still waiting to find out about PAP for diabetes medications.    Did tell him that we could give Nettie a couple of sample pens. I also told him that I spoke with CHANDLER Holder, regarding a referral to Care Coordination to assist if there is a possibility of any other kind of help for medications.    Will send referral to Care Coordination. Perhaps a 340B might be possible?

## 2018-04-20 NOTE — TELEPHONE ENCOUNTER
Patient was contacted and was informed that Dayna will be out of the office and did not get a chance to review the paperwork for the denied applications but Rosi Rodriguez in diabetic education stated that she will contact the patient and inform him with information that she has for him and his wife.  Salma Collier CMA([facility])

## 2018-04-20 NOTE — TELEPHONE ENCOUNTER
"Biguanide Agents Failed  metFORMIN (GLUCOPHAGE) 1000 MG tablet [Pharmacy Med Name: METFORMIN 1000MG TABLETS]       Rerun Protocol (4/20/2018 9:43 AM)        Blood pressure less than 140/90 in past 6 months           BP Readings from Last 3 Encounters:   07/07/17 122/78   03/30/17 139/84   03/10/17 122/76                      Patient has documented LDL within the past 12 mos.           Recent Labs   Lab Test  03/13/17   0858   LDL  98                  Patient has had a Microalbumin in the past 12 mos.           Recent Labs   Lab Test  03/13/17   0858   MICROL  <5   UMALCR  Unable to calculate due to low value                  Patient has documented A1c within the specified period of time.           Recent Labs   Lab Test  03/13/17   0858   A1C  6.6*                  Recent (6 mo) or future (30 days) visit within the authorizing provider's specialty       Patient had office visit in the last 6 months or has a visit in the next 30 days with authorizing provider or within the authorizing provider's specialty.  See \"Patient Info\" tab in inbasket, or \"Choose Columns\" in Meds & Orders section of the refill encounter.                 Patient is age 10 or older                  Patient's CR is NOT>1.4 OR Patient's EGFR is NOT<45 within past 12 mos.           Recent Labs   Lab Test  03/13/17   0858   GFRESTIMATED  75   GFRESTBLACK  >90   GFR Calc          Recent Labs   Lab Test  03/13/17   0858   CR  1.06                  Patient does NOT have a diagnosis of CHF.                     Metformin      Last Written Prescription Date:  7/7/17  Last Fill Quantity: 60,   # refills: 11  Last Office Visit: 7/7/17  Future Office visit:       Routing refill request to provider for review/approval because: Pt due for diabetic f/u and labs. PCP to advise on refill. Letter sent to pt.     "

## 2018-05-10 ENCOUNTER — PATIENT OUTREACH (OUTPATIENT)
Dept: CARE COORDINATION | Facility: OTHER | Age: 48
End: 2018-05-10

## 2018-05-10 NOTE — PROGRESS NOTES
Clinic Care Coordination Contact  Care Team Conversations    Care Coordinator worked with Pierre to apply for 340B program through [facility].  He and his wife are ineligible for this program.  They will continue to work with [facility] for medications until they are able to secure health insurance coverage.  His status will be left as potential for Care Coordination.  His DM management team may reach out to CC at any time if they feel he would benefit from additional services/management.    Lauren Pipkin, BS-RN   CHF and General Care Coordinator  [phone] Option # 1

## 2018-05-31 ENCOUNTER — TELEPHONE (OUTPATIENT)
Dept: FAMILY MEDICINE | Facility: OTHER | Age: 48
End: 2018-05-31

## 2018-05-31 DIAGNOSIS — E11.9 TYPE 2 DIABETES MELLITUS WITHOUT COMPLICATION, WITHOUT LONG-TERM CURRENT USE OF INSULIN (H): ICD-10-CM

## 2018-05-31 NOTE — LETTER
[facility]  [address] [place] [address]                    Pierre Sharma  [address]  [facility] [address]          June 1, 2018       Dear Pierre Sharma,    APPOINTMENT REMINDER:       Our record indicates that it is time for you to be seen for an office visit with CHANDLER Ovalle.  You may call our office at [phone] to schedule an appointment for diabetes follow up.Please disregard this notice if you have already made an appointment.      Sincerely,    CHANDLER Ovalle    MRN: [id number]

## 2018-05-31 NOTE — TELEPHONE ENCOUNTER
4:59 PM    Reason for Call: Phone Call    Description: Pt calling stating that he is out of his glimipride and needs refill asap. He would like 90 day supply also. Please call him back to advise.    Was an appointment offered for this call? No   If yes : Appointment type              Date    Preferred method for responding to this message: Telephone Call  What is your phone number ?    If we cannot reach you directly, may we leave a detailed response at the number you provided? No    Can this message wait until your PCP/provider returns, if available today?     Ashley Mayo

## 2018-06-01 RX ORDER — GLIMEPIRIDE 2 MG/1
TABLET ORAL
Qty: 30 TABLET | Refills: 0 | Status: SHIPPED | OUTPATIENT
Start: 2018-06-01 | End: 2018-07-12

## 2018-06-01 NOTE — TELEPHONE ENCOUNTER
Patient of CHANDLER Ovalle. Amaryl last filled on 3.29.18 #320.Last office visit on 7.17.17.Recall appointment letter sent. Pended.Thank you.

## 2018-07-11 ENCOUNTER — PATIENT OUTREACH (OUTPATIENT)
Dept: CARE COORDINATION | Facility: OTHER | Age: 48
End: 2018-07-11

## 2018-07-11 DIAGNOSIS — E11.9 TYPE 2 DIABETES MELLITUS WITHOUT COMPLICATION, WITHOUT LONG-TERM CURRENT USE OF INSULIN (H): ICD-10-CM

## 2018-07-11 DIAGNOSIS — E11.8 TYPE 2 DIABETES MELLITUS WITH COMPLICATION, WITHOUT LONG-TERM CURRENT USE OF INSULIN (H): ICD-10-CM

## 2018-07-11 ASSESSMENT — ACTIVITIES OF DAILY LIVING (ADL): DEPENDENT_IADLS:: INDEPENDENT

## 2018-07-11 NOTE — PROGRESS NOTES
Clinic Care Coordination Contact  Care Team Conversations    Care Coordinator received a phone call from Pierre today with request for refills of several of his medications.  He continues to be without insurance coverage while he awaits his return to work this fall.  He reports he has not been seen at [facility].  He believes his last visit was here with Dayna Cronin July 2017.  He was advised CC will forward this request to PCP.    Lauren Pipkin, BS-RN   CHF and General Care Coordinator  [phone] Option # 1

## 2018-07-12 RX ORDER — GLIMEPIRIDE 2 MG/1
TABLET ORAL
Qty: 90 TABLET | Refills: 0 | Status: SHIPPED | OUTPATIENT
Start: 2018-07-12 | End: 2018-10-12

## 2018-07-12 RX ORDER — LISINOPRIL 5 MG/1
5 TABLET ORAL DAILY
Qty: 90 TABLET | Refills: 0 | Status: SHIPPED | OUTPATIENT
Start: 2018-07-12 | End: 2018-11-10

## 2018-07-12 NOTE — PROGRESS NOTES
I did fill all this.  They should just get some labs via [facility] if he is able to go in and get a pressure check and kidney and a1c.

## 2018-07-19 NOTE — PROGRESS NOTES
Clinic Care Coordination Contact  Care Team Conversations    LM for Pierre to call back to assure he received the info that he is to see [facility] for labs and BP check.    Lauren Pipkin, BS-RN   CHF and General Care Coordinator  [phone] Option # 1

## 2018-09-12 DIAGNOSIS — E11.8 TYPE 2 DIABETES MELLITUS WITH COMPLICATION, WITHOUT LONG-TERM CURRENT USE OF INSULIN (H): ICD-10-CM

## 2018-09-13 NOTE — TELEPHONE ENCOUNTER
Metformin - patient is overdue for Diabetic visit. No visit scheduled.    Last visit: 7.07.18  Last refill: 7.12.18 #90

## 2018-10-12 DIAGNOSIS — E11.9 TYPE 2 DIABETES MELLITUS WITHOUT COMPLICATION, WITHOUT LONG-TERM CURRENT USE OF INSULIN (H): ICD-10-CM

## 2018-10-15 NOTE — TELEPHONE ENCOUNTER
amaryl      Last Written Prescription Date:  7/12/18  Last Fill Quantity: 90,   # refills: 0  Last Office Visit: 9/12/18

## 2018-10-16 NOTE — TELEPHONE ENCOUNTER
Patient has documented A1c within the specified period of time.       If HgbA1C is 8 or greater, it needs to be on file within the past 3 months.  If less than 8, must be on file within the past 6 months.          Recent Labs   Lab Test  03/13/17   0858   A1C  6.6*        Pt due for office visit and labs. Nurse to call? Please advise

## 2018-10-16 NOTE — TELEPHONE ENCOUNTER
Metformin 1000 mg      Last Written Prescription Date:  9/13/18  Last Fill Quantity: 60,   # refills: 0  Last Office Visit: 7/7/17

## 2018-10-17 RX ORDER — GLIMEPIRIDE 2 MG/1
TABLET ORAL
Qty: 90 TABLET | Refills: 0 | Status: SHIPPED | OUTPATIENT
Start: 2018-10-17 | End: 2018-11-10

## 2018-10-22 DIAGNOSIS — E11.8 TYPE 2 DIABETES MELLITUS WITH COMPLICATION, WITHOUT LONG-TERM CURRENT USE OF INSULIN (H): ICD-10-CM

## 2018-10-22 NOTE — TELEPHONE ENCOUNTER
Metformin 1000mg      Last Written Prescription Date:  9/13/18  Last Fill Quantity: 60,   # refills: 0  Last Office Visit: 7/17/17  Future Office visit:

## 2018-11-10 DIAGNOSIS — E11.8 TYPE 2 DIABETES MELLITUS WITH COMPLICATION, WITHOUT LONG-TERM CURRENT USE OF INSULIN (H): ICD-10-CM

## 2018-11-10 DIAGNOSIS — E11.9 TYPE 2 DIABETES MELLITUS WITHOUT COMPLICATION, WITHOUT LONG-TERM CURRENT USE OF INSULIN (H): ICD-10-CM

## 2018-11-12 DIAGNOSIS — E11.8 TYPE 2 DIABETES MELLITUS WITH COMPLICATION, WITHOUT LONG-TERM CURRENT USE OF INSULIN (H): ICD-10-CM

## 2018-11-12 RX ORDER — LISINOPRIL 5 MG/1
TABLET ORAL
Qty: 30 TABLET | Refills: 0 | Status: SHIPPED | OUTPATIENT
Start: 2018-11-12 | End: 2019-01-09

## 2018-11-12 RX ORDER — GLIMEPIRIDE 2 MG/1
TABLET ORAL
Qty: 30 TABLET | Refills: 0 | Status: SHIPPED | OUTPATIENT
Start: 2018-11-12 | End: 2019-03-18

## 2018-11-12 NOTE — TELEPHONE ENCOUNTER
metFORMIN (GLUCOPHAGE) 1000 MG tablet  Last Written Prescription Date:  10/22/18  Last Fill Quantity: 60,   # refills: 0  Last Office Visit: 7/7/17  Future Office visit:

## 2018-11-12 NOTE — TELEPHONE ENCOUNTER
Lisinopril and Metformin - patient overdue for appointment.  Please advise.   Last visit: 7.7.17

## 2018-11-13 NOTE — TELEPHONE ENCOUNTER
"Biguanide Agents Failed  metFORMIN (GLUCOPHAGE) 1000 MG tablet       Rerun Protocol (11/12/2018 12:19 PM)        Blood pressure less than 140/90 in past 6 months           BP Readings from Last 3 Encounters:   07/07/17 122/78   03/30/17 139/84   03/10/17 122/76                      Patient has documented LDL within the past 12 mos.           Recent Labs   Lab Test  03/13/17   0858   LDL  98                  Patient has had a Microalbumin in the past 15 mos.           Recent Labs   Lab Test  03/13/17   0858   MICROL  <5   UMALCR  Unable to calculate due to low value                  Patient has documented A1c within the specified period of time.       If HgbA1C is 8 or greater, it needs to be on file within the past 3 months.  If less than 8, must be on file within the past 6 months.          Recent Labs   Lab Test  03/13/17   0858   A1C  6.6*                  Recent (6 mo) or future (30 days) visit within the authorizing provider's specialty       Patient had office visit in the last 6 months or has a visit in the next 30 days with authorizing provider or within the authorizing provider's specialty.  See \"Patient Info\" tab in inbasket, or \"Choose Columns\" in Meds & Orders section of the refill encounter.                 "

## 2018-11-24 DIAGNOSIS — E11.8 TYPE 2 DIABETES MELLITUS WITH COMPLICATION, WITHOUT LONG-TERM CURRENT USE OF INSULIN (H): ICD-10-CM

## 2018-11-27 NOTE — TELEPHONE ENCOUNTER
Patient overdue for appointment and labs.  Has not been seen since 7.7.17.  No visit scheduled.  Please advise. Thank you

## 2019-01-09 DIAGNOSIS — E11.8 TYPE 2 DIABETES MELLITUS WITH COMPLICATION, WITHOUT LONG-TERM CURRENT USE OF INSULIN (H): ICD-10-CM

## 2019-01-09 NOTE — LETTER
January 10, 2019      Pierre Sharma  [address] [place] [address]        Dear Pierre,     APPOINTMENT REMINDER:   Our records indicates that it is time for you to be seen for a follow up appointment.     Your current medication request for lisinopril will be approved for one refill but you will need to be seen before any additional refills can be approved.  Taking care of your health is important to us, and ongoing visits with your provider are vital to your care.    We look forward to seeing you in the near future.  You may call our office at [phone] to schedule a visit.     Please disregard this notice if you have already made an appointment.        Sincerely,    CHANDLER Alamo

## 2019-01-09 NOTE — TELEPHONE ENCOUNTER
lisinopril  Last Written Prescription Date: 11/12/18  Last Fill Quantity: 30 # of Refills: 0  Last Office Visit: 7/7/17

## 2019-01-10 RX ORDER — LISINOPRIL 5 MG/1
5 TABLET ORAL DAILY
Qty: 30 TABLET | Refills: 0 | Status: SHIPPED | OUTPATIENT
Start: 2019-01-10 | End: 2019-03-05

## 2019-03-04 DIAGNOSIS — E11.8 TYPE 2 DIABETES MELLITUS WITH COMPLICATION, WITHOUT LONG-TERM CURRENT USE OF INSULIN (H): ICD-10-CM

## 2019-03-04 NOTE — TELEPHONE ENCOUNTER
lisinopril (PRINIVIL/ZESTRIL) 5 MG tablet    Last Written Prescription Date:  1/10/19  Last Fill Quantity: 30,   # refills: 0  Last Office Visit: 7/7/17  Future Office visit:

## 2019-03-05 RX ORDER — LISINOPRIL 5 MG/1
5 TABLET ORAL DAILY
Qty: 30 TABLET | Refills: 0 | Status: SHIPPED | OUTPATIENT
Start: 2019-03-05 | End: 2019-04-01

## 2019-03-18 DIAGNOSIS — E11.9 TYPE 2 DIABETES MELLITUS WITHOUT COMPLICATION, WITHOUT LONG-TERM CURRENT USE OF INSULIN (H): ICD-10-CM

## 2019-03-18 NOTE — TELEPHONE ENCOUNTER
Laverne       Last Written Prescription Date:  11/12/2018  Last Fill Quantity: 30,   # refills: 0  Last Office Visit: 7/7/2017  Future Office visit:

## 2019-03-20 NOTE — TELEPHONE ENCOUNTER
Protocol failed due to    Blood pressure less than 140/90 in past 6 months    Patient has documented LDL within the past 12 mos.    Patient has had a Microalbumin in the past 12 mos.    Patient has documented A1c within the specified period of time.    Patient has a recent creatinine (normal) within the past 12 mos.    Recent (6 mo) or future (30 days) visit within the authorizing provider's specialty     Labs ordered. Multiple letters sent. Please advise. Thank you!

## 2019-03-21 RX ORDER — GLIMEPIRIDE 2 MG/1
TABLET ORAL
Qty: 30 TABLET | Refills: 0 | Status: SHIPPED | OUTPATIENT
Start: 2019-03-21 | End: 2019-04-25

## 2019-03-28 NOTE — PROGRESS NOTES
SUBJECTIVE:   Pierre Sharma is a 48 year old male who presents to clinic today for the following health issues:        Diabetes Follow-up      Patient is checking blood sugars: 1 times a week    Diabetic concerns: None     Symptoms of hypoglycemia (low blood sugar): shaky, dizzy, weak     Paresthesias (numbness or burning in feet) or sores: No     Date of last diabetic eye exam: 2 years    BP Readings from Last 2 Encounters:   07/07/17 122/78   03/30/17 139/84     Hemoglobin A1C (%)   Date Value   03/13/2017 6.6 (H)     LDL Cholesterol Calculated (mg/dL)   Date Value   03/13/2017 98       Diabetes Management Resources    Amount of exercise or physical activity: 2-3 days/week for an average of 45-60 minutes    Problems taking medications regularly: No    Medication side effects: none    Diet: regular (no restrictions)    Musculoskeletal problem/pain      Duration: both legs.     Description  Location: lower legs both after driving having some issues.     Intensity:  moderate    Accompanying signs and symptoms: none    History  Previous similar problem: YES  Previous evaluation:  none    Precipitating or alleviating factors:  Trauma or overuse: no   Aggravating factors include: sitting and also goes to his back.     Therapies tried and outcome: heat, ice and stretching             Problem list and histories reviewed & adjusted, as indicated.  Additional history: as documented    Patient Active Problem List   Diagnosis     ACP (advance care planning)     Type 2 diabetes mellitus with complication, without long-term current use of insulin (H)     Past Surgical History:   Procedure Laterality Date     CHOLECYSTECTOMY  2012       Social History     Tobacco Use     Smoking status: Current Every Day Smoker     Packs/day: 0.05     Types: Cigarettes     Smokeless tobacco: Never Used     Tobacco comment: pt is cuting back on his own    Substance Use Topics     Alcohol use: Yes     Comment: occassional      Family History  "  Problem Relation Age of Onset     Angina Mother      Hypertension Mother      Diabetes Mother            Reviewed and updated as needed this visit by clinical staff       Reviewed and updated as needed this visit by Provider         ROS:  Constitutional, neuro, ENT, endocrine, pulmonary, cardiac, gastrointestinal, genitourinary, musculoskeletal, integument and psychiatric systems are negative, except as otherwise noted.    OBJECTIVE:                                                    /84 (BP Location: Left arm, Patient Position: Sitting, Cuff Size: Adult Regular)   Pulse 69   Temp 97.5  F (36.4  C) (Tympanic)   Ht 1.689 m (5' 6.5\")   Wt 82.1 kg (181 lb)   SpO2 95%   BMI 28.78 kg/m    Body mass index is 28.78 kg/m .  GENERAL APPEARANCE: healthy, alert and no distress  EYES: Eyes grossly normal to inspection, PERRL and conjunctivae and sclerae normal  HENT: ear canals and TM's normal and nose and mouth without ulcers or lesions  NECK: no adenopathy, no asymmetry, masses, or scars and thyroid normal to palpation  RESP: lungs clear to auscultation - no rales, rhonchi or wheezes  CV: regular rates and rhythm, normal S1 S2, no S3 or S4 and no murmur, click or rub  LYMPHATICS: no cervical adenopathy  ABDOMEN: soft, nontender, without hepatosplenomegaly or masses and bowel sounds normal  MS: extremities normal- no gross deformities noted  SKIN: no suspicious lesions or rashes  NEURO: Normal strength and tone, mentation intact and speech normal  DIABETIC FOOT EXAM: normal DP and PT pulses, no trophic changes or ulcerative lesions and normal sensory exam  PSYCH: mentation appears normal and affect normal/bright    Diagnostic test results:  Diagnostic Test Results:  Results for orders placed or performed in visit on 04/01/19   Hemoglobin A1c   Result Value Ref Range    Hemoglobin A1C 6.3 (H) 0 - 5.6 %   Lipid Profile   Result Value Ref Range    Cholesterol 163 <200 mg/dL    Triglycerides 98 <150 mg/dL    HDL " Cholesterol 37 (L) >39 mg/dL    LDL Cholesterol Calculated 106 (H) <100 mg/dL    Non HDL Cholesterol 126 <130 mg/dL   TSH with free T4 reflex   Result Value Ref Range    TSH 1.05 0.40 - 4.00 mU/L   Albumin Random Urine Quantitative with Creat Ratio   Result Value Ref Range    Creatinine Urine 81 mg/dL    Albumin Urine mg/L <5 mg/L    Albumin Urine mg/g Cr Unable to calculate due to low value 0 - 17 mg/g Cr   Magnesium   Result Value Ref Range    Magnesium 1.8 1.6 - 2.3 mg/dL   Comprehensive metabolic panel   Result Value Ref Range    Sodium 139 133 - 144 mmol/L    Potassium 4.3 3.4 - 5.3 mmol/L    Chloride 107 94 - 109 mmol/L    Carbon Dioxide 27 20 - 32 mmol/L    Anion Gap 5 3 - 14 mmol/L    Glucose 94 70 - 99 mg/dL    Urea Nitrogen 9 7 - 30 mg/dL    Creatinine 0.85 0.66 - 1.25 mg/dL    GFR Estimate >90 >60 mL/min/[1.73_m2]    GFR Estimate If Black >90 >60 mL/min/[1.73_m2]    Calcium 8.7 8.5 - 10.1 mg/dL    Bilirubin Total 0.3 0.2 - 1.3 mg/dL    Albumin 4.0 3.4 - 5.0 g/dL    Protein Total 7.5 6.8 - 8.8 g/dL    Alkaline Phosphatase 95 40 - 150 U/L    ALT 54 0 - 70 U/L    AST 30 0 - 45 U/L        ASSESSMENT/PLAN:                                                    1. Tobacco abuse  Encouraged to stop smoking. States has cut way back.   - Tobacco Cessation - Order to Satisfy Health Maintenance    2. Tobacco abuse counseling  Has cut way back.     3. Pain in both lower legs  At night feeling this check magnesium.  Cramping at times   - Magnesium    4. Type 2 diabetes mellitus with complication, without long-term current use of insulin (H)  He is here for labs. Has been working on his health from [employer].   He has lost weight and states kept his sugars in pretty good control. Will be given refill of below and see him back in 6 months. His eye exam is up to date and his foot exam is negative.   - Hemoglobin A1c  - Lipid Profile  - TSH with free T4 reflex  - Albumin Random Urine Quantitative with Creat Ratio  -  Tobacco Cessation - Order to Satisfy Health Maintenance  - Magnesium  - lisinopril (PRINIVIL/ZESTRIL) 5 MG tablet; Take 1 tablet (5 mg) by mouth daily  Dispense: 90 tablet; Refill: 3  - Comprehensive metabolic panel    5. Dermatitis  For his elbows.   - triamcinolone (KENALOG) 0.5 % external ointment; Apply 1 g topically 2 times daily  Dispense: 15 g; Refill: 1    6. Hyperpigmentation  Will help this stop the itch and avoid over bathing.   - triamcinolone (KENALOG) 0.5 % external ointment; Apply 1 g topically 2 times daily  Dispense: 15 g; Refill: 1        See Patient Instructions    CHANDLER Alamo  [facility] [place]

## 2019-04-01 ENCOUNTER — OFFICE VISIT (OUTPATIENT)
Dept: FAMILY MEDICINE | Facility: OTHER | Age: 49
End: 2019-04-01
Attending: PHYSICIAN ASSISTANT
Payer: COMMERCIAL

## 2019-04-01 VITALS
OXYGEN SATURATION: 95 % | HEIGHT: 67 IN | SYSTOLIC BLOOD PRESSURE: 140 MMHG | BODY MASS INDEX: 28.41 KG/M2 | WEIGHT: 181 LBS | DIASTOLIC BLOOD PRESSURE: 84 MMHG | TEMPERATURE: 97.5 F | HEART RATE: 69 BPM

## 2019-04-01 DIAGNOSIS — Z72.0 TOBACCO ABUSE: ICD-10-CM

## 2019-04-01 DIAGNOSIS — M79.661 PAIN IN BOTH LOWER LEGS: Primary | ICD-10-CM

## 2019-04-01 DIAGNOSIS — Z71.6 TOBACCO ABUSE COUNSELING: ICD-10-CM

## 2019-04-01 DIAGNOSIS — E11.8 TYPE 2 DIABETES MELLITUS WITH COMPLICATION, WITHOUT LONG-TERM CURRENT USE OF INSULIN (H): ICD-10-CM

## 2019-04-01 DIAGNOSIS — M79.662 PAIN IN BOTH LOWER LEGS: Primary | ICD-10-CM

## 2019-04-01 DIAGNOSIS — L81.9 HYPERPIGMENTATION: ICD-10-CM

## 2019-04-01 DIAGNOSIS — L30.9 DERMATITIS: ICD-10-CM

## 2019-04-01 LAB
ALBUMIN SERPL-MCNC: 4 G/DL (ref 3.4–5)
ALP SERPL-CCNC: 95 U/L (ref 40–150)
ALT SERPL W P-5'-P-CCNC: 54 U/L (ref 0–70)
ANION GAP SERPL CALCULATED.3IONS-SCNC: 5 MMOL/L (ref 3–14)
AST SERPL W P-5'-P-CCNC: 30 U/L (ref 0–45)
BILIRUB SERPL-MCNC: 0.3 MG/DL (ref 0.2–1.3)
BUN SERPL-MCNC: 9 MG/DL (ref 7–30)
CALCIUM SERPL-MCNC: 8.7 MG/DL (ref 8.5–10.1)
CHLORIDE SERPL-SCNC: 107 MMOL/L (ref 94–109)
CHOLEST SERPL-MCNC: 163 MG/DL
CO2 SERPL-SCNC: 27 MMOL/L (ref 20–32)
CREAT SERPL-MCNC: 0.85 MG/DL (ref 0.66–1.25)
CREAT UR-MCNC: 81 MG/DL
GFR SERPL CREATININE-BSD FRML MDRD: >90 ML/MIN/{1.73_M2}
GLUCOSE SERPL-MCNC: 94 MG/DL (ref 70–99)
HBA1C MFR BLD: 6.3 % (ref 0–5.6)
HDLC SERPL-MCNC: 37 MG/DL
LDLC SERPL CALC-MCNC: 106 MG/DL
MAGNESIUM SERPL-MCNC: 1.8 MG/DL (ref 1.6–2.3)
MICROALBUMIN UR-MCNC: <5 MG/L
MICROALBUMIN/CREAT UR: NORMAL MG/G CR (ref 0–17)
NONHDLC SERPL-MCNC: 126 MG/DL
POTASSIUM SERPL-SCNC: 4.3 MMOL/L (ref 3.4–5.3)
PROT SERPL-MCNC: 7.5 G/DL (ref 6.8–8.8)
SODIUM SERPL-SCNC: 139 MMOL/L (ref 133–144)
TRIGL SERPL-MCNC: 98 MG/DL
TSH SERPL DL<=0.005 MIU/L-ACNC: 1.05 MU/L (ref 0.4–4)

## 2019-04-01 PROCEDURE — 80061 LIPID PANEL: CPT | Performed by: PHYSICIAN ASSISTANT

## 2019-04-01 PROCEDURE — 83735 ASSAY OF MAGNESIUM: CPT | Performed by: PHYSICIAN ASSISTANT

## 2019-04-01 PROCEDURE — 82043 UR ALBUMIN QUANTITATIVE: CPT | Performed by: PHYSICIAN ASSISTANT

## 2019-04-01 PROCEDURE — 84443 ASSAY THYROID STIM HORMONE: CPT | Performed by: PHYSICIAN ASSISTANT

## 2019-04-01 PROCEDURE — 99214 OFFICE O/P EST MOD 30 MIN: CPT | Performed by: PHYSICIAN ASSISTANT

## 2019-04-01 PROCEDURE — 36415 COLL VENOUS BLD VENIPUNCTURE: CPT | Performed by: PHYSICIAN ASSISTANT

## 2019-04-01 PROCEDURE — 80053 COMPREHEN METABOLIC PANEL: CPT | Performed by: PHYSICIAN ASSISTANT

## 2019-04-01 PROCEDURE — 83036 HEMOGLOBIN GLYCOSYLATED A1C: CPT | Performed by: PHYSICIAN ASSISTANT

## 2019-04-01 RX ORDER — TRIAMCINOLONE ACETONIDE 5 MG/G
1 OINTMENT TOPICAL 2 TIMES DAILY
Qty: 15 G | Refills: 1 | Status: SHIPPED | OUTPATIENT
Start: 2019-04-01

## 2019-04-01 RX ORDER — LISINOPRIL 5 MG/1
5 TABLET ORAL DAILY
Qty: 90 TABLET | Refills: 3 | Status: SHIPPED | OUTPATIENT
Start: 2019-04-01 | End: 2020-04-24

## 2019-04-01 ASSESSMENT — PATIENT HEALTH QUESTIONNAIRE - PHQ9
5. POOR APPETITE OR OVEREATING: NOT AT ALL
SUM OF ALL RESPONSES TO PHQ QUESTIONS 1-9: 0

## 2019-04-01 ASSESSMENT — ANXIETY QUESTIONNAIRES
3. WORRYING TOO MUCH ABOUT DIFFERENT THINGS: NOT AT ALL
6. BECOMING EASILY ANNOYED OR IRRITABLE: NOT AT ALL
GAD7 TOTAL SCORE: 0
7. FEELING AFRAID AS IF SOMETHING AWFUL MIGHT HAPPEN: NOT AT ALL
5. BEING SO RESTLESS THAT IT IS HARD TO SIT STILL: NOT AT ALL
2. NOT BEING ABLE TO STOP OR CONTROL WORRYING: NOT AT ALL
1. FEELING NERVOUS, ANXIOUS, OR ON EDGE: NOT AT ALL

## 2019-04-01 ASSESSMENT — MIFFLIN-ST. JEOR: SCORE: 1636.7

## 2019-04-01 ASSESSMENT — PAIN SCALES - GENERAL: PAINLEVEL: NO PAIN (0)

## 2019-04-01 NOTE — NURSING NOTE
"Chief Complaint   Patient presents with     Diabetes       Initial /84 (BP Location: Left arm, Patient Position: Sitting, Cuff Size: Adult Regular)   Pulse 69   Temp 97.5  F (36.4  C) (Tympanic)   Ht 1.689 m (5' 6.5\")   Wt 82.1 kg (181 lb)   SpO2 95%   BMI 28.78 kg/m   Estimated body mass index is 28.78 kg/m  as calculated from the following:    Height as of this encounter: 1.689 m (5' 6.5\").    Weight as of this encounter: 82.1 kg (181 lb).  Medication Reconciliation: complete    Cheyenne Saravia LPN  "

## 2019-04-01 NOTE — PATIENT INSTRUCTIONS

## 2019-04-02 ASSESSMENT — ANXIETY QUESTIONNAIRES: GAD7 TOTAL SCORE: 0

## 2019-04-07 RX ORDER — PRAVASTATIN SODIUM 20 MG
20 TABLET ORAL DAILY
Qty: 90 TABLET | Refills: 3 | Status: SHIPPED | OUTPATIENT
Start: 2019-04-07

## 2019-04-22 DIAGNOSIS — E11.8 TYPE 2 DIABETES MELLITUS WITH COMPLICATION, WITHOUT LONG-TERM CURRENT USE OF INSULIN (H): ICD-10-CM

## 2019-04-23 DIAGNOSIS — E11.9 TYPE 2 DIABETES MELLITUS WITHOUT COMPLICATION, WITHOUT LONG-TERM CURRENT USE OF INSULIN (H): ICD-10-CM

## 2019-04-23 NOTE — TELEPHONE ENCOUNTER
metformin      Last Written Prescription Date:  11/28/18  Last Fill Quantity: 60,   # refills: 3  Last Office Visit: 4/1/19

## 2019-04-23 NOTE — TELEPHONE ENCOUNTER
Glimepiride 2 mg  Last Written Prescription Date:  3/21/2019  Last Fill Quantity: 30,   # refills: 0  Last Office Visit: 4/1/2019  Future Office visit:

## 2019-04-25 RX ORDER — GLIMEPIRIDE 2 MG/1
TABLET ORAL
Qty: 30 TABLET | Refills: 3 | Status: SHIPPED | OUTPATIENT
Start: 2019-04-25 | End: 2019-10-09

## 2019-10-07 DIAGNOSIS — E11.9 TYPE 2 DIABETES MELLITUS WITHOUT COMPLICATION, WITHOUT LONG-TERM CURRENT USE OF INSULIN (H): ICD-10-CM

## 2019-10-07 DIAGNOSIS — E11.8 TYPE 2 DIABETES MELLITUS WITH COMPLICATION, WITHOUT LONG-TERM CURRENT USE OF INSULIN (H): ICD-10-CM

## 2019-10-09 RX ORDER — GLIMEPIRIDE 2 MG/1
TABLET ORAL
Qty: 30 TABLET | Refills: 1 | Status: SHIPPED | OUTPATIENT
Start: 2019-10-09 | End: 2019-12-31

## 2019-11-07 DIAGNOSIS — E11.8 TYPE 2 DIABETES MELLITUS WITH COMPLICATION, WITHOUT LONG-TERM CURRENT USE OF INSULIN (H): ICD-10-CM

## 2019-12-27 DIAGNOSIS — E11.9 TYPE 2 DIABETES MELLITUS WITHOUT COMPLICATION, WITHOUT LONG-TERM CURRENT USE OF INSULIN (H): ICD-10-CM

## 2019-12-27 NOTE — TELEPHONE ENCOUNTER
Glimepiride      Last Written Prescription Date:  10/9/19  Last Fill Quantity: 30,   # refills: 1  Last Office Visit: 4/1/19  Future Office visit:       Routing refill request to provider for review/approval

## 2019-12-31 RX ORDER — GLIMEPIRIDE 2 MG/1
TABLET ORAL
Qty: 30 TABLET | Refills: 0 | Status: SHIPPED | OUTPATIENT
Start: 2019-12-31 | End: 2020-02-06

## 2020-02-03 DIAGNOSIS — E11.9 TYPE 2 DIABETES MELLITUS WITHOUT COMPLICATION, WITHOUT LONG-TERM CURRENT USE OF INSULIN (H): ICD-10-CM

## 2020-02-03 NOTE — TELEPHONE ENCOUNTER
Glimepiride   Last Office Visit: 4/1/2019  Last Refill Date:12/31/2019  # 30          Refills  0      Thank you!

## 2020-02-06 RX ORDER — GLIMEPIRIDE 2 MG/1
TABLET ORAL
Qty: 30 TABLET | Refills: 0 | Status: SHIPPED | OUTPATIENT
Start: 2020-02-06 | End: 2020-03-12

## 2020-03-02 ENCOUNTER — TELEPHONE (OUTPATIENT)
Dept: FAMILY MEDICINE | Facility: OTHER | Age: 50
End: 2020-03-02

## 2020-03-02 DIAGNOSIS — E11.8 TYPE 2 DIABETES MELLITUS WITH COMPLICATION, WITHOUT LONG-TERM CURRENT USE OF INSULIN (H): ICD-10-CM

## 2020-03-02 NOTE — TELEPHONE ENCOUNTER
8:54 AM    Reason for Call: OVERBOOK    Patient is having the following symptoms: follow up for 1 weeks.    The patient is requesting an appointment for ASAP with Dayna Cronin.    Was an appointment offered for this call? Yes  If yes : Appointment type              Date 03/18/2020 does not want to wait this long     Preferred method for responding to this message: Telephone Call  What is your phone number ?[phone]    If we cannot reach you directly, may we leave a detailed response at the number you provided? Yes    Can this message wait until your PCP/provider returns, if unavailable today? Not applicable, pcp is in     [facility] [place]

## 2020-03-02 NOTE — TELEPHONE ENCOUNTER
Called patient back regarding appointment, he will be calling back shortly to schedule. Joya Bonilla LPN

## 2020-03-04 NOTE — TELEPHONE ENCOUNTER
metformin      Last Written Prescription Date:  11/8/19  Last Fill Quantity: 60,   # refills: 2  Last Office Visit: 4/1/19  Future Office visit:

## 2020-03-05 NOTE — TELEPHONE ENCOUNTER
Protocol failed due to    Blood pressure less than 140/90 in past 6 months    Patient has documented A1c within the specified period of time.    Recent (6 mo) or future (30 days) visit within the authorizing provider's specialty     Lab Results   Component Value Date    A1C 6.3 04/01/2019    A1C 6.6 03/13/2017     LOV 4/1/19. Please advise. Thank you!

## 2020-03-10 DIAGNOSIS — E11.9 TYPE 2 DIABETES MELLITUS WITHOUT COMPLICATION, WITHOUT LONG-TERM CURRENT USE OF INSULIN (H): ICD-10-CM

## 2020-03-10 NOTE — TELEPHONE ENCOUNTER
Laverne       Last Written Prescription Date:  2/6/2020  Last Fill Quantity: 30,   # refills: 0  Last Office Visit: 4/1/2019  Future Office visit:

## 2020-03-12 RX ORDER — GLIMEPIRIDE 2 MG/1
TABLET ORAL
Qty: 30 TABLET | Refills: 0 | Status: SHIPPED | OUTPATIENT
Start: 2020-03-12 | End: 2023-06-22

## 2020-04-14 DIAGNOSIS — E11.9 TYPE 2 DIABETES MELLITUS WITHOUT COMPLICATION, WITHOUT LONG-TERM CURRENT USE OF INSULIN (H): ICD-10-CM

## 2020-04-15 RX ORDER — GLIMEPIRIDE 2 MG/1
TABLET ORAL
Qty: 30 TABLET | Refills: 0 | OUTPATIENT
Start: 2020-04-15

## 2020-04-15 RX ORDER — GLIMEPIRIDE 2 MG/1
2 TABLET ORAL
Qty: 90 TABLET | Refills: 1 | Status: SHIPPED | OUTPATIENT
Start: 2020-04-15 | End: 2020-11-04

## 2020-04-15 NOTE — TELEPHONE ENCOUNTER
glimepiride (AMARYL) 2 MG tablet       Last Written Prescription Date:  3/12/20  Last Fill Quantity: 30,   # refills: 0  Last Office Visit: 4/1/19  Future Office visit:       Routing refill request to provider for review/approval because:      Sulfonylurea Agents Eecwrc91/15/2020 08:39 AM   Patient has documented A1c within the specified period of time. Protocol Details    Patient has a recent creatinine (normal) within the past 12 mos.     Recent (6 mo) or future (30 days) visit within the authorizing provider's specialty

## 2020-04-23 DIAGNOSIS — E11.8 TYPE 2 DIABETES MELLITUS WITH COMPLICATION, WITHOUT LONG-TERM CURRENT USE OF INSULIN (H): ICD-10-CM

## 2020-04-23 NOTE — TELEPHONE ENCOUNTER
lisinopril (PRINIVIL/ZESTRIL) 5 MG tablet      Last Written Prescription Date:  4/1/19  Last Fill Quantity: 90,   # refills: 3  Last Office Visit: 4/1/19  Future Office visit:       Routing refill request to provider for review/approval because:

## 2020-04-24 RX ORDER — LISINOPRIL 5 MG/1
5 TABLET ORAL DAILY
Qty: 90 TABLET | Refills: 3 | Status: SHIPPED | OUTPATIENT
Start: 2020-04-24

## 2020-06-23 DIAGNOSIS — E11.8 TYPE 2 DIABETES MELLITUS WITH COMPLICATION, WITHOUT LONG-TERM CURRENT USE OF INSULIN (H): ICD-10-CM

## 2020-06-23 NOTE — TELEPHONE ENCOUNTER
Metformin 1000mg      Last Written Prescription Date:  3/5/20  Last Fill Quantity: 60,   # refills: 2  Last Office Visit: 4/1/19  Future Office visit:       Routing refill request to provider for review/approval because:  Need office visit. Please advise

## 2020-08-11 DIAGNOSIS — E11.8 TYPE 2 DIABETES MELLITUS WITH COMPLICATION, WITHOUT LONG-TERM CURRENT USE OF INSULIN (H): ICD-10-CM

## 2020-08-11 NOTE — TELEPHONE ENCOUNTER
Juan Carlos needing refill as [place] gets [place] but unable to travel due to covid. Pended medication.

## 2020-08-13 RX ORDER — LINAGLIPTIN 5 MG/1
5 TABLET, FILM COATED ORAL DAILY
Qty: 90 TABLET | Refills: 0 | Status: SHIPPED | OUTPATIENT
Start: 2020-08-13 | End: 2021-05-24

## 2020-08-13 NOTE — TELEPHONE ENCOUNTER
linagliptin (TRADJENTA) 5 MG TABS tablet     DPP4 Inhibitors Protocol Failed    HgbA1C in past 3 or 6 months    Lab Results   Component Value Date    A1C 6.3 04/01/2019    A1C 6.6 03/13/2017     Normal serum creatinine in past 12 months    Creatinine   Date Value Ref Range Status   04/01/2019 0.85 0.66 - 1.25 mg/dL Final     Recent (6 mo) or future (30 days) visit within the authorizing provider's specialty

## 2020-10-07 DIAGNOSIS — E11.8 TYPE 2 DIABETES MELLITUS WITH COMPLICATION, WITHOUT LONG-TERM CURRENT USE OF INSULIN (H): ICD-10-CM

## 2020-10-07 NOTE — TELEPHONE ENCOUNTER
Metformin      Last Written Prescription Date:  06/24/20  Last Fill Quantity: 60,   # refills: 2  Last Office Visit: 04/01/19  Future Office visit:       Routing refill request to provider for review/approval because:  Medication is reported/historical

## 2020-11-03 ENCOUNTER — TELEPHONE (OUTPATIENT)
Dept: FAMILY MEDICINE | Facility: OTHER | Age: 50
End: 2020-11-03

## 2020-11-03 DIAGNOSIS — E11.9 TYPE 2 DIABETES MELLITUS WITHOUT COMPLICATION, WITHOUT LONG-TERM CURRENT USE OF INSULIN (H): ICD-10-CM

## 2020-11-03 NOTE — TELEPHONE ENCOUNTER
javed      Last Written Prescription Date:  4/15/2020  Last Fill Quantity: 90,   # refills: 1  Last Office Visit: 4 1/19  Future Office visit:

## 2020-11-03 NOTE — LETTER
[facility] [place]  [address]  [facility] [address]  Phone: [phone]    November 4, 2020        Pierre Sharma  [address]  [facility] [address]        Dear Pierre Nj there. We need to see you in Clinic. This is something you will either have to arrange with our financial department or go to [facility] and do a phone visit. We need labs and an office documentation.     Please contact me for questions or concerns.      Sincerely,        CHANDLER Alamo

## 2020-11-04 RX ORDER — GLIMEPIRIDE 2 MG/1
2 TABLET ORAL
Qty: 90 TABLET | Refills: 1 | Status: SHIPPED | OUTPATIENT
Start: 2020-11-04 | End: 2021-05-17

## 2020-11-05 DIAGNOSIS — E11.8 TYPE 2 DIABETES MELLITUS WITH COMPLICATION, WITHOUT LONG-TERM CURRENT USE OF INSULIN (H): ICD-10-CM

## 2020-11-05 NOTE — TELEPHONE ENCOUNTER
metFORMIN (GLUCOPHAGE) 1000 MG tablet (patient requesting 180 tabs)    Last Written Prescription Date:  10/07/2020  Last Fill Quantity: 60,   # refills: 2  Last Office Visit: 04/01/2019  Future Office visit:       Routing refill request to provider for review/approval because:

## 2021-02-03 ENCOUNTER — TELEPHONE (OUTPATIENT)
Dept: FAMILY MEDICINE | Facility: OTHER | Age: 51
End: 2021-02-03

## 2021-02-03 NOTE — TELEPHONE ENCOUNTER
Pt calling for himself and spouse and wondering approximately when they would be eligible for Covid Vaccine? He is Type 2 and he thinks she is too.    Updated that over 75 and older/Phase 1 can get vaccine at this time.    Any recommendation?    Ondina Aceves RN

## 2021-02-07 DIAGNOSIS — E11.8 TYPE 2 DIABETES MELLITUS WITH COMPLICATION, WITHOUT LONG-TERM CURRENT USE OF INSULIN (H): ICD-10-CM

## 2021-02-09 NOTE — TELEPHONE ENCOUNTER
metformin      Last Written Prescription Date:  11/5/2020  Last Fill Quantity: 180,   # refills: 0  Last Office Visit: 4/1/19  Future Office visit:

## 2021-02-11 ENCOUNTER — TELEPHONE (OUTPATIENT)
Dept: FAMILY MEDICINE | Facility: OTHER | Age: 51
End: 2021-02-11

## 2021-02-11 NOTE — TELEPHONE ENCOUNTER
8:56 AM    Reason for Call: Return Call Request     Description:     Call from patient stating he spoke with Dayna Cronin's Nurse, Joya last week, about the Covid 19 Vaccine as patient has DM Type 2.     Patient is requesting a return call to discuss the vaccine and to be on a wait list.     Notified patient the vaccine is currently being administered to ages 75 and older, once [facility] moves to the next phase, patient would be included and provided with an update. Patient also notified [facility] does have a wait list currently which includes only 75 years and older at this time until move into the next phase of vaccine administration.     Patient still requesting a return call from Joya.     Was an appointment offered for this call? No  If yes : Appointment type              Date    Preferred method for responding to this message: Telephone Call  What is your phone number- [phone]     If we cannot reach you directly, may we leave a detailed response at the number you provided? Yes    Can this message wait until your PCP/provider returns, if available today?   Not applicable      Barby Montiel RN

## 2021-02-11 NOTE — TELEPHONE ENCOUNTER
I called patient back, he wanted to be set up with his sons [employer] account. He is really wanting to get the vaccine and is inquring if there is any extra doses left. At this time we are not doing this. But told him that once this is an option we will call people.

## 2021-03-06 ENCOUNTER — HEALTH MAINTENANCE LETTER (OUTPATIENT)
Age: 51
End: 2021-03-06

## 2021-05-14 DIAGNOSIS — E11.8 TYPE 2 DIABETES MELLITUS WITH COMPLICATION, WITHOUT LONG-TERM CURRENT USE OF INSULIN (H): ICD-10-CM

## 2021-05-14 NOTE — TELEPHONE ENCOUNTER
METFORMIN      Last Written Prescription Date:  2-  Last Fill Quantity: 180,   # refills: 0  Last Office Visit: 4-1-2019  Future Office visit:       Routing refill request to provider for review/approval because:  Drug not on the G, P or [facility] refill protocol or controlled substance

## 2021-05-16 DIAGNOSIS — E11.9 TYPE 2 DIABETES MELLITUS WITHOUT COMPLICATION, WITHOUT LONG-TERM CURRENT USE OF INSULIN (H): ICD-10-CM

## 2021-05-17 RX ORDER — LISINOPRIL 10 MG/1
TABLET ORAL
Qty: 45 TABLET | Refills: 4 | Status: SHIPPED | OUTPATIENT
Start: 2021-05-17 | End: 2022-08-04

## 2021-05-17 RX ORDER — GLIMEPIRIDE 2 MG/1
TABLET ORAL
Qty: 90 TABLET | Refills: 1 | Status: SHIPPED | OUTPATIENT
Start: 2021-05-17 | End: 2021-11-26

## 2021-05-17 NOTE — TELEPHONE ENCOUNTER
Lisinopril      Last Written Prescription Date:  4/24/20  Last Fill Quantity: 90,   # refills: 3  Last Office Visit: 4/1/19  Future Office visit:       Routing refill request to provider for review/approval because:      Amaryl      Last Written Prescription Date:  3/12/20  Last Fill Quantity: 30,   # refills: 0  Last Office Visit: 4/1/19  Future Office visit:       Routing refill request to provider for review/approval because:

## 2021-05-22 DIAGNOSIS — E11.8 TYPE 2 DIABETES MELLITUS WITH COMPLICATION, WITHOUT LONG-TERM CURRENT USE OF INSULIN (H): ICD-10-CM

## 2021-05-24 RX ORDER — LINAGLIPTIN 5 MG/1
TABLET, FILM COATED ORAL
Qty: 22 TABLET | Refills: 0 | Status: SHIPPED | OUTPATIENT
Start: 2021-05-24 | End: 2021-09-01

## 2021-05-24 NOTE — TELEPHONE ENCOUNTER
Kenneth      Last Written Prescription Date:  08/13/20  Last Fill Quantity: 90,   # refills: 0  Last Office Visit: 04/01/19  Future Office visit:

## 2021-07-17 DIAGNOSIS — E11.8 TYPE 2 DIABETES MELLITUS WITH COMPLICATION, WITHOUT LONG-TERM CURRENT USE OF INSULIN (H): ICD-10-CM

## 2021-07-18 NOTE — TELEPHONE ENCOUNTER
Metformin       Last Written Prescription Date:  5/14/2021  Last Fill Quantity: 180,   # refills: 0  Last Office Visit: 4/1/2019  Future Office visit:

## 2021-08-06 NOTE — LETTER
November 13, 2018      Pierre Sharma  [address] [place] [address]        Dear Pierre,     APPOINTMENT REMINDER:   Our records indicates that it is time for you to be seen for your diabetic follow-up and lab work.      You will need to be seen before any additional refills can be approved. Taking care of your health is important to us, and ongoing visits with your provider are vital to your care.    We look forward to seeing you in the near future.  You may call our office at [phone] to schedule a visit.     Please disregard this notice if you have already made an appointment.         Sincerely,  CHANDLER Alamo          
done

## 2021-08-28 DIAGNOSIS — E11.8 TYPE 2 DIABETES MELLITUS WITH COMPLICATION, WITHOUT LONG-TERM CURRENT USE OF INSULIN (H): ICD-10-CM

## 2021-08-28 RX ORDER — LINAGLIPTIN 5 MG/1
TABLET, FILM COATED ORAL
Qty: 90 TABLET | Status: CANCELLED | OUTPATIENT
Start: 2021-08-28

## 2021-08-31 NOTE — TELEPHONE ENCOUNTER
Failed protocol    Need's 6 month f/u    Hemoglobin A1C   Date Value Ref Range Status   04/01/2019 6.3 (H) 0 - 5.6 % Final     Comment:     Normal <5.7% Prediabetes 5.7-6.4%  Diabetes 6.5% or higher - adopted from ADA   consensus guidelines.       Creatinine   Date Value Ref Range Status   04/01/2019 0.85 0.66 - 1.25 mg/dL Final       TRADJENTA 5 MG TABS tablet     Last Written Prescription Date:  5/24/21  Last Fill Quantity: 22,   # refills: 0  Last Office Visit: 4/1/19  Future Office visit:       Routing refill request to provider for review/approval because:  Drug not on the [facility], [facility] or [facility] refill protocol or controlled substance

## 2021-09-01 RX ORDER — LINAGLIPTIN 5 MG/1
TABLET, FILM COATED ORAL
Qty: 90 TABLET | Refills: 11 | Status: SHIPPED | OUTPATIENT
Start: 2021-09-01 | End: 2021-12-03

## 2021-09-14 NOTE — TELEPHONE ENCOUNTER
BP Readings from Last 3 Encounters:   04/01/19 140/84   07/07/17 122/78   03/30/17 139/84     Hemoglobin A1C   Date Value Ref Range Status   04/01/2019 6.3 (H) 0 - 5.6 % Final     Comment:     Normal <5.7% Prediabetes 5.7-6.4%  Diabetes 6.5% or higher - adopted from ADA   consensus guidelines.          Adri Harris(Resident)

## 2021-10-09 ENCOUNTER — HEALTH MAINTENANCE LETTER (OUTPATIENT)
Age: 51
End: 2021-10-09

## 2021-11-23 DIAGNOSIS — E11.9 TYPE 2 DIABETES MELLITUS WITHOUT COMPLICATION, WITHOUT LONG-TERM CURRENT USE OF INSULIN (H): ICD-10-CM

## 2021-11-25 NOTE — TELEPHONE ENCOUNTER
Laverne      Last Written Prescription Date:  5/17/21  Last Fill Quantity: 90,   # refills: 1  Last Office Visit: 4/1/19  Future Office visit:       Routing refill request to provider for review/approval because:

## 2021-11-26 RX ORDER — GLIMEPIRIDE 2 MG/1
TABLET ORAL
Qty: 90 TABLET | Refills: 1 | Status: SHIPPED | OUTPATIENT
Start: 2021-11-26 | End: 2022-06-09

## 2021-11-30 DIAGNOSIS — E11.8 TYPE 2 DIABETES MELLITUS WITH COMPLICATION, WITHOUT LONG-TERM CURRENT USE OF INSULIN (H): ICD-10-CM

## 2021-12-01 DIAGNOSIS — E11.8 TYPE 2 DIABETES MELLITUS WITH COMPLICATION, WITHOUT LONG-TERM CURRENT USE OF INSULIN (H): ICD-10-CM

## 2021-12-02 RX ORDER — LINAGLIPTIN 5 MG/1
TABLET, FILM COATED ORAL
Qty: 22 TABLET | OUTPATIENT
Start: 2021-12-02

## 2021-12-02 NOTE — TELEPHONE ENCOUNTER
Kenneth      Last Written Prescription Date:  9/1/21  Last Fill Quantity: 90,   # refills: 11  Last Office Visit: 4/1/19  Future Office visit:       Routing refill request to provider for review/approval because:

## 2021-12-03 RX ORDER — LINAGLIPTIN 5 MG/1
TABLET, FILM COATED ORAL
Qty: 30 TABLET | Refills: 11 | Status: SHIPPED | OUTPATIENT
Start: 2021-12-03 | End: 2022-03-07

## 2022-01-04 DIAGNOSIS — E11.8 TYPE 2 DIABETES MELLITUS WITH COMPLICATION, WITHOUT LONG-TERM CURRENT USE OF INSULIN (H): ICD-10-CM

## 2022-03-05 DIAGNOSIS — E11.8 TYPE 2 DIABETES MELLITUS WITH COMPLICATION, WITHOUT LONG-TERM CURRENT USE OF INSULIN (H): ICD-10-CM

## 2022-03-07 NOTE — TELEPHONE ENCOUNTER
Needs office visit     Kenneth      Last Written Prescription Date:  10.15.21  Last Fill Quantity: #22,   # refills: 0  Last Office Visit: 4.1.19  Future Office visit:       Routing refill request to provider for review/approval because:

## 2022-03-10 NOTE — TELEPHONE ENCOUNTER
Spoke to pt and scheduled appt 4/12/22 , pt is out of Rx , can you either refill full Rx or give enough to get pt through to appt.   Thank you

## 2022-03-13 RX ORDER — LINAGLIPTIN 5 MG/1
TABLET, FILM COATED ORAL
Qty: 22 TABLET | Refills: 0 | Status: SHIPPED | OUTPATIENT
Start: 2022-03-13 | End: 2022-05-09

## 2022-03-20 ENCOUNTER — HEALTH MAINTENANCE LETTER (OUTPATIENT)
Age: 52
End: 2022-03-20

## 2022-04-28 DIAGNOSIS — E11.8 TYPE 2 DIABETES MELLITUS WITH COMPLICATION, WITHOUT LONG-TERM CURRENT USE OF INSULIN (H): ICD-10-CM

## 2022-04-28 NOTE — LETTER
April 28, 2022      Pierre Sharma  [address] [place] [address]        Dear Pierre,     APPOINTMENT REMINDER:   Our records indicates that it is time for you to be seen for medication follow up.     Your current medication request will not be approved. You will need to be seen before any additional refills can be approved.  Taking care of your health is important to us, and ongoing visits with your provider are vital to your care.    We look forward to seeing you in the near future.  You may call our office at [phone] to schedule a visit.     Please disregard this notice if you have already made an appointment.      Sincerely,        CHANDLER Alamo

## 2022-04-28 NOTE — TELEPHONE ENCOUNTER
Called and LM with patient and letter sent to schedule an appointment due to no showing last appointment. LOV 04/01/19.   Please advise, thank you.     metFORMIN (GLUCOPHAGE) 1000 MG tablet      Last Written Prescription Date:  01/04/22  Last Fill Quantity: 180,   # refills: 0  Last Office Visit: 04/18/22 no show, 04/01/19  Future Office visit:   LM for patient to schedule an appointment.     Routing refill request to provider for review/approval because:

## 2022-05-09 DIAGNOSIS — E11.8 TYPE 2 DIABETES MELLITUS WITH COMPLICATION, WITHOUT LONG-TERM CURRENT USE OF INSULIN (H): ICD-10-CM

## 2022-05-09 RX ORDER — LINAGLIPTIN 5 MG/1
5 TABLET, FILM COATED ORAL DAILY
Qty: 22 TABLET | Refills: 0 | Status: SHIPPED | OUTPATIENT
Start: 2022-05-09 | End: 2022-09-19

## 2022-05-09 NOTE — TELEPHONE ENCOUNTER
Kenneth       Last Written Prescription Date:  3/13/2022  Last Fill Quantity: 22,   # refills: 0  Last Office Visit: 4/18/2022  Future Office visit:

## 2022-05-16 ENCOUNTER — HEALTH MAINTENANCE LETTER (OUTPATIENT)
Age: 52
End: 2022-05-16

## 2022-05-18 DIAGNOSIS — E11.8 TYPE 2 DIABETES MELLITUS WITH COMPLICATION, WITHOUT LONG-TERM CURRENT USE OF INSULIN (H): ICD-10-CM

## 2022-05-18 NOTE — TELEPHONE ENCOUNTER
metformin      Last Written Prescription Date:  4/28/22  Last Fill Quantity: 30,   # refills: 0  Last Office Visit: 4/1/19  Future Office visit:

## 2022-06-08 DIAGNOSIS — E11.9 TYPE 2 DIABETES MELLITUS WITHOUT COMPLICATION, WITHOUT LONG-TERM CURRENT USE OF INSULIN (H): ICD-10-CM

## 2022-06-09 RX ORDER — GLIMEPIRIDE 2 MG/1
TABLET ORAL
Qty: 90 TABLET | Refills: 1 | Status: SHIPPED | OUTPATIENT
Start: 2022-06-09 | End: 2022-12-07

## 2022-06-09 NOTE — TELEPHONE ENCOUNTER
Glimepiride      Last Written Prescription Date:  11/26/21  Last Fill Quantity: 90,   # refills: 1  Last Office Visit: 4/1/19  Future Office visit:

## 2022-06-10 DIAGNOSIS — E11.8 TYPE 2 DIABETES MELLITUS WITH COMPLICATION, WITHOUT LONG-TERM CURRENT USE OF INSULIN (H): ICD-10-CM

## 2022-07-06 DIAGNOSIS — E11.8 TYPE 2 DIABETES MELLITUS WITH COMPLICATION, WITHOUT LONG-TERM CURRENT USE OF INSULIN (H): ICD-10-CM

## 2022-07-08 NOTE — TELEPHONE ENCOUNTER
metformin      Last Written Prescription Date:  6/10/22  Last Fill Quantity: 30,   # refills: 0  Last Office Visit: 4/1/19  Future Office visit:

## 2022-08-03 DIAGNOSIS — E11.9 TYPE 2 DIABETES MELLITUS WITHOUT COMPLICATION, WITHOUT LONG-TERM CURRENT USE OF INSULIN (H): ICD-10-CM

## 2022-08-04 RX ORDER — LISINOPRIL 10 MG/1
TABLET ORAL
Qty: 45 TABLET | Refills: 4 | Status: SHIPPED | OUTPATIENT
Start: 2022-08-04

## 2022-08-04 NOTE — TELEPHONE ENCOUNTER
Lisinopril (Zestril) 10 mg tablet  Take 1/2 tablet by mouth every day     Last Written Prescription Date:  5-  Last Fill Quantity: 45 tablet,   # refills: 4  Last Office Visit: 4-1-2019  Future Office visit:

## 2022-08-23 DIAGNOSIS — E11.8 TYPE 2 DIABETES MELLITUS WITH COMPLICATION, WITHOUT LONG-TERM CURRENT USE OF INSULIN (H): ICD-10-CM

## 2022-08-24 NOTE — TELEPHONE ENCOUNTER
Failed protocol    Lab Results   Component Value Date    A1C 6.3 04/01/2019    A1C 6.6 03/13/2017     Creatinine   Date Value Ref Range Status   04/01/2019 0.85 0.66 - 1.25 mg/dL Final     metFORMIN (GLUCOPHAGE) 1000 MG tablet      Last Written Prescription Date:  7/8/22  Last Fill Quantity: 90,   # refills: 0  Last Office Visit: 4/1/19  Future Office visit:       Routing refill request to provider for review/approval because:  Drug not on the G, P or [facility] refill protocol or controlled substance

## 2022-09-11 ENCOUNTER — HEALTH MAINTENANCE LETTER (OUTPATIENT)
Age: 52
End: 2022-09-11

## 2022-09-19 DIAGNOSIS — E11.8 TYPE 2 DIABETES MELLITUS WITH COMPLICATION, WITHOUT LONG-TERM CURRENT USE OF INSULIN (H): ICD-10-CM

## 2022-09-19 RX ORDER — LINAGLIPTIN 5 MG/1
5 TABLET, FILM COATED ORAL DAILY
Qty: 22 TABLET | Refills: 0 | Status: SHIPPED | OUTPATIENT
Start: 2022-09-19

## 2022-09-19 NOTE — TELEPHONE ENCOUNTER
Kenneth       Last Written Prescription Date:  5/9/22  Last Fill Quantity: 22,   # refills: 0  Last Office Visit: 4/18/22  Future Office visit:

## 2022-10-03 DIAGNOSIS — E11.8 TYPE 2 DIABETES MELLITUS WITH COMPLICATION, WITHOUT LONG-TERM CURRENT USE OF INSULIN (H): ICD-10-CM

## 2022-10-03 NOTE — TELEPHONE ENCOUNTER
Metformin       Last Written Prescription Date:  8/24/22  Last Fill Quantity: 90,   # refills: 0  Last Office Visit: 4/18/22  Future Office visit:

## 2022-10-04 DIAGNOSIS — E11.8 TYPE 2 DIABETES MELLITUS WITH COMPLICATION, WITHOUT LONG-TERM CURRENT USE OF INSULIN (H): ICD-10-CM

## 2022-10-04 RX ORDER — LINAGLIPTIN 5 MG/1
5 TABLET, FILM COATED ORAL DAILY
Qty: 22 TABLET | Refills: 0 | OUTPATIENT
Start: 2022-10-04

## 2022-10-04 NOTE — TELEPHONE ENCOUNTER
Patient called stating that the Tredjenta is too expensive and is wondering if there is an alternative.  States it is almost $500.00 a month with his insurance.  He should be in town in one month and will make an appointment to see provider at that time, he is currently in [place].    linagliptin (TRADJENTA) 5 MG TABS tablet      Last Written Prescription Date:  9/19/22  Last Fill Quantity: 22,   # refills: 0  Last Office Visit: 4/1/19  Future Office visit:       Routing refill request to provider for review/approval because:  Drug not on the [facility], [facility] or [facility] refill protocol or controlled substance      .Is out of the metformin need's it filled today      metFORMIN (GLUCOPHAGE) 1000 MG tablet      Last Written Prescription Date:  8/24/22  Last Fill Quantity: 90,   # refills: 0  Last Office Visit: 4/1/19  Future Office visit:       Routing refill request to provider for review/approval because:  Drug not on the [facility], [facility] or [facility] refill protocol or controlled substance

## 2022-10-11 DIAGNOSIS — E11.8 TYPE 2 DIABETES MELLITUS WITH COMPLICATION, WITHOUT LONG-TERM CURRENT USE OF INSULIN (H): ICD-10-CM

## 2022-12-06 DIAGNOSIS — E11.9 TYPE 2 DIABETES MELLITUS WITHOUT COMPLICATION, WITHOUT LONG-TERM CURRENT USE OF INSULIN (H): ICD-10-CM

## 2022-12-07 RX ORDER — GLIMEPIRIDE 2 MG/1
TABLET ORAL
Qty: 90 TABLET | Refills: 1 | Status: SHIPPED | OUTPATIENT
Start: 2022-12-07

## 2022-12-07 NOTE — TELEPHONE ENCOUNTER
GLIMEPIRIDE 2MG TABLETS  Last Written Prescription Date:  6/9/2022  Last Fill Quantity: 90,   # refills: 1  Last Office Visit: 4/1/2019  Future Office visit:       Routing refill request to provider for review/approval because:

## 2022-12-21 DIAGNOSIS — E11.8 TYPE 2 DIABETES MELLITUS WITH COMPLICATION, WITHOUT LONG-TERM CURRENT USE OF INSULIN (H): ICD-10-CM

## 2022-12-21 NOTE — TELEPHONE ENCOUNTER
Pt is asking for it today. He is out of medication and then did an emergency refill for three days.    Pt calling and would like 90 day supply and needs to go to [facility]-he is working there.    Metformin    Last Written Prescription Date:  10.11.2022  Last Fill Quantity: 180,   # refills: 0  Last Office Visit: 4.1.19  Future Office visit:       Routing refill request to provider for review/approval because:  See last office visit date.## Updated he is due for office visit.    Ondina Aceves RN

## 2023-01-23 ENCOUNTER — HEALTH MAINTENANCE LETTER (OUTPATIENT)
Age: 53
End: 2023-01-23

## 2023-05-06 ENCOUNTER — HEALTH MAINTENANCE LETTER (OUTPATIENT)
Age: 53
End: 2023-05-06

## 2023-06-22 DIAGNOSIS — E11.9 TYPE 2 DIABETES MELLITUS WITHOUT COMPLICATION, WITHOUT LONG-TERM CURRENT USE OF INSULIN (H): ICD-10-CM

## 2023-06-22 RX ORDER — GLIMEPIRIDE 2 MG/1
TABLET ORAL
Qty: 30 TABLET | Refills: 0 | Status: SHIPPED | OUTPATIENT
Start: 2023-06-22 | End: 2023-07-28

## 2023-06-22 NOTE — TELEPHONE ENCOUNTER
"Glimepiride 2 mg      Last Written Prescription Date:  12/17/2022  Last Fill Quantity: 90,   # refills: 1  Last Office Visit: 04/18/2022  Future Office visit:       Routing refill request to provider for review/approval because:  Sulfonylurea Agents Failed      Patient has documented A1c within the specified period of time.    If HgbA1C is 8 or greater, it needs to be on file within the past 3 months.  If less than 8, must be on file within the past 6 months.          Recent Labs   Lab Test 04/01/19  1041   A1C 6.3*       Patient has a recent creatinine (normal) within the past 12 mos.        Recent Labs   Lab Test 04/01/19  1041   CR 0.85      Ok to refill medication if creatinine is low    Recent (6 mo) or future (30 days) visit within the authorizing provider's specialty    Patient had office visit in the last 6 months or has a visit in the next 30 days with authorizing provider or within the authorizing provider's specialty.  See \"Patient Info\" tab in inbasket, or \"Choose Columns\" in Meds & Orders section of the refill encounter.        "

## 2023-07-23 DIAGNOSIS — E11.9 TYPE 2 DIABETES MELLITUS WITHOUT COMPLICATION, WITHOUT LONG-TERM CURRENT USE OF INSULIN (H): ICD-10-CM

## 2023-07-28 RX ORDER — GLIMEPIRIDE 2 MG/1
TABLET ORAL
Qty: 90 TABLET | Refills: 1 | Status: SHIPPED | OUTPATIENT
Start: 2023-07-28 | End: 2024-01-11

## 2023-07-29 ENCOUNTER — HEALTH MAINTENANCE LETTER (OUTPATIENT)
Age: 53
End: 2023-07-29

## 2024-01-11 DIAGNOSIS — E11.9 TYPE 2 DIABETES MELLITUS WITHOUT COMPLICATION, WITHOUT LONG-TERM CURRENT USE OF INSULIN (H): ICD-10-CM

## 2024-01-11 NOTE — TELEPHONE ENCOUNTER
Laverne      Last Written Prescription Date:  07/28/23  Last Fill Quantity: 90,   # refills: 1  Last Office Visit: 04/18/22  Future Office visit:

## 2024-01-14 RX ORDER — GLIMEPIRIDE 2 MG/1
TABLET ORAL
Qty: 90 TABLET | Refills: 3 | Status: SHIPPED | OUTPATIENT
Start: 2024-01-14

## 2024-02-24 ENCOUNTER — HEALTH MAINTENANCE LETTER (OUTPATIENT)
Age: 54
End: 2024-02-24
